# Patient Record
Sex: FEMALE | Race: WHITE | NOT HISPANIC OR LATINO | Employment: FULL TIME | URBAN - NONMETROPOLITAN AREA
[De-identification: names, ages, dates, MRNs, and addresses within clinical notes are randomized per-mention and may not be internally consistent; named-entity substitution may affect disease eponyms.]

---

## 2020-01-23 ENCOUNTER — OFFICE VISIT (OUTPATIENT)
Dept: SURGERY | Facility: CLINIC | Age: 45
End: 2020-01-23

## 2020-01-23 VITALS
TEMPERATURE: 98.6 F | HEIGHT: 69 IN | HEART RATE: 73 BPM | BODY MASS INDEX: 28.58 KG/M2 | SYSTOLIC BLOOD PRESSURE: 138 MMHG | WEIGHT: 193 LBS | DIASTOLIC BLOOD PRESSURE: 88 MMHG

## 2020-01-23 DIAGNOSIS — K81.9 CHOLECYSTITIS: Primary | ICD-10-CM

## 2020-01-23 PROCEDURE — 99204 OFFICE O/P NEW MOD 45 MIN: CPT | Performed by: SURGERY

## 2020-01-23 RX ORDER — ACETAMINOPHEN 325 MG/1
650 TABLET ORAL ONCE
Status: CANCELLED | OUTPATIENT
Start: 2020-01-30 | End: 2020-01-23

## 2020-01-23 RX ORDER — FAMOTIDINE 20 MG/1
20 TABLET, FILM COATED ORAL NIGHTLY PRN
COMMUNITY
End: 2022-12-21

## 2020-01-23 NOTE — PROGRESS NOTES
Subjective   Estee Owens is a 44 y.o. female     Chief Complaint: Biliary colic    History of Present Illness presents with approximately one month of upper abdominal pain made worse by eating.  Pain burning like and clearly sharper and colic like with eating.  Also more severe pain.  No jaundice or hx of pancreatitis.  Had ultrasound done in [place] demonstrates gallstones and normal gallbladder otherwise with normal common bile duct.  Labs are normal.  No hx of reflux or pud per patient.     Review of Systems   Constitutional: Negative.    HENT: Negative.    Eyes: Negative.    Respiratory: Negative.    Cardiovascular: Negative.    Gastrointestinal: Positive for abdominal pain and constipation.        Intermittent epigastric pain,Heartburn   Endocrine: Negative.    Genitourinary: Negative.    Musculoskeletal: Negative.    Skin: Negative.    Allergic/Immunologic: Negative.    Neurological: Negative.    Hematological: Negative.    Psychiatric/Behavioral: Negative.      Past Medical History:   Diagnosis Date   • Asthma    • Gallstones    • PONV (postoperative nausea and vomiting)    • Seasonal allergies      Past Surgical History:   Procedure Laterality Date   • DILATATION AND CURETTAGE       Family History   Problem Relation Age of Onset   • Heart disease Father    • Cancer Maternal Grandmother    • Hypertension Maternal Grandfather    • Heart disease Paternal Grandfather      Social History     Socioeconomic History   • Marital status:      Spouse name: Not on file   • Number of children: Not on file   • Years of education: Not on file   • Highest education level: Not on file   Tobacco Use   • Smoking status: Never Smoker   • Smokeless tobacco: Never Used   Substance and Sexual Activity   • Alcohol use: Never     Frequency: Never   • Drug use: Never   • Sexual activity: Defer     Allergies   Allergen Reactions   • Augmentin [Amoxicillin-Pot Clavulanate] Nausea Only   • Biaxin [Clarithromycin]  Nausea Only     Vitals:    01/23/20 1551   BP: 138/88   Pulse: 73   Temp: 98.6 °F (37 °C)       Home Medications:  Prior to Admission medications    Medication Sig Start Date End Date Taking? Authorizing Provider   famotidine (PEPCID) 20 MG tablet Take 20 mg by mouth 2 (Two) Times a Day.   Yes Provider, MD Samantha       Objective   Physical Exam   Constitutional: She is oriented to person, place, and time. She appears well-developed and well-nourished. No distress.   HENT:   Head: Normocephalic and atraumatic.   Nose: Nose normal.   Eyes: Conjunctivae are normal. No scleral icterus.   Neck: Normal range of motion. No tracheal deviation present. No thyromegaly present.   Cardiovascular: Normal rate, regular rhythm and normal heart sounds.   No murmur heard.  Pulmonary/Chest: Effort normal and breath sounds normal. No respiratory distress. She has no wheezes. She has no rales. She exhibits no tenderness.   Abdominal: Soft. She exhibits no distension. There is no tenderness. There is no rebound and no guarding. No hernia.   Musculoskeletal: She exhibits no tenderness or deformity.   Neurological: She is alert and oriented to person, place, and time.   Skin: Skin is warm and dry. No rash noted.   Psychiatric: She has a normal mood and affect. Her behavior is normal. Judgment and thought content normal.   Vitals reviewed.  mild epigastric and ruq ttp.  No peritoneal signs or mass    Assessment/Plan Cholelithiasis and/or cholecystitis.  Recommend laparoscopic cholecystitis and intraoperative cholangiogram.  Fully discussed procedure, alternatives, risks and benefits with patient and she understands and wishes to procede.        The encounter diagnosis was Cholecystitis.                     This document has been electronically signed by Franky Max MD on January 30, 2020 6:56 AM

## 2020-01-23 NOTE — PATIENT INSTRUCTIONS

## 2020-01-27 ENCOUNTER — APPOINTMENT (OUTPATIENT)
Dept: PREADMISSION TESTING | Facility: HOSPITAL | Age: 45
End: 2020-01-27

## 2020-01-27 VITALS
OXYGEN SATURATION: 99 % | DIASTOLIC BLOOD PRESSURE: 89 MMHG | RESPIRATION RATE: 12 BRPM | HEART RATE: 75 BPM | SYSTOLIC BLOOD PRESSURE: 144 MMHG | BODY MASS INDEX: 28.58 KG/M2 | WEIGHT: 193 LBS | HEIGHT: 69 IN

## 2020-01-27 RX ORDER — SODIUM CHLORIDE, SODIUM GLUCONATE, SODIUM ACETATE, POTASSIUM CHLORIDE, AND MAGNESIUM CHLORIDE 526; 502; 368; 37; 30 MG/100ML; MG/100ML; MG/100ML; MG/100ML; MG/100ML
1000 INJECTION, SOLUTION INTRAVENOUS CONTINUOUS
Status: CANCELLED | OUTPATIENT
Start: 2020-01-30

## 2020-01-29 ENCOUNTER — ANESTHESIA EVENT (OUTPATIENT)
Dept: PERIOP | Facility: HOSPITAL | Age: 45
End: 2020-01-29

## 2020-01-30 ENCOUNTER — HOSPITAL ENCOUNTER (OUTPATIENT)
Facility: HOSPITAL | Age: 45
Setting detail: HOSPITAL OUTPATIENT SURGERY
Discharge: HOME | End: 2020-01-30
Attending: SURGERY | Admitting: SURGERY

## 2020-01-30 ENCOUNTER — APPOINTMENT (OUTPATIENT)
Dept: GENERAL RADIOLOGY | Facility: HOSPITAL | Age: 45
End: 2020-01-30

## 2020-01-30 ENCOUNTER — ANESTHESIA (OUTPATIENT)
Dept: PERIOP | Facility: HOSPITAL | Age: 45
End: 2020-01-30

## 2020-01-30 VITALS
RESPIRATION RATE: 18 BRPM | OXYGEN SATURATION: 96 % | HEART RATE: 82 BPM | DIASTOLIC BLOOD PRESSURE: 68 MMHG | HEIGHT: 69 IN | SYSTOLIC BLOOD PRESSURE: 129 MMHG | TEMPERATURE: 96.9 F | BODY MASS INDEX: 27.62 KG/M2 | WEIGHT: 186.51 LBS

## 2020-01-30 DIAGNOSIS — K81.9 CHOLECYSTITIS: ICD-10-CM

## 2020-01-30 LAB — B-HCG UR QL: NEGATIVE

## 2020-01-30 PROCEDURE — 25010000002 MIDAZOLAM PER 1 MG: Performed by: NURSE ANESTHETIST, CERTIFIED REGISTERED

## 2020-01-30 PROCEDURE — 88304 TISSUE EXAM BY PATHOLOGIST: CPT | Performed by: SURGERY

## 2020-01-30 PROCEDURE — 25010000002 ONDANSETRON PER 1 MG: Performed by: NURSE ANESTHETIST, CERTIFIED REGISTERED

## 2020-01-30 PROCEDURE — 47563 LAPARO CHOLECYSTECTOMY/GRAPH: CPT | Performed by: SURGERY

## 2020-01-30 PROCEDURE — 81025 URINE PREGNANCY TEST: CPT | Performed by: ANESTHESIOLOGY

## 2020-01-30 PROCEDURE — 88304 TISSUE EXAM BY PATHOLOGIST: CPT | Performed by: PATHOLOGY

## 2020-01-30 PROCEDURE — 25010000002 LEVOFLOXACIN PER 250 MG: Performed by: NURSE ANESTHETIST, CERTIFIED REGISTERED

## 2020-01-30 PROCEDURE — 25010000002 IOPAMIDOL 61 % SOLUTION: Performed by: SURGERY

## 2020-01-30 PROCEDURE — 25010000002 HYDROMORPHONE PER 4 MG: Performed by: NURSE ANESTHETIST, CERTIFIED REGISTERED

## 2020-01-30 PROCEDURE — 25010000002 PROPOFOL 10 MG/ML EMULSION: Performed by: NURSE ANESTHETIST, CERTIFIED REGISTERED

## 2020-01-30 PROCEDURE — 25010000002 FENTANYL CITRATE (PF) 100 MCG/2ML SOLUTION: Performed by: NURSE ANESTHETIST, CERTIFIED REGISTERED

## 2020-01-30 PROCEDURE — 25010000002 NEOSTIGMINE 4 MG/4ML SOLUTION PREFILLED SYRINGE: Performed by: NURSE ANESTHETIST, CERTIFIED REGISTERED

## 2020-01-30 PROCEDURE — 25010000002 SUCCINYLCHOLINE PER 20 MG: Performed by: NURSE ANESTHETIST, CERTIFIED REGISTERED

## 2020-01-30 PROCEDURE — 25010000002 DEXAMETHASONE PER 1 MG: Performed by: NURSE ANESTHETIST, CERTIFIED REGISTERED

## 2020-01-30 PROCEDURE — 74300 X-RAY BILE DUCTS/PANCREAS: CPT | Performed by: SURGERY

## 2020-01-30 PROCEDURE — 0: Performed by: SURGERY

## 2020-01-30 PROCEDURE — C1889 IMPLANT/INSERT DEVICE, NOC: HCPCS | Performed by: SURGERY

## 2020-01-30 PROCEDURE — 76000 FLUOROSCOPY <1 HR PHYS/QHP: CPT

## 2020-01-30 RX ORDER — ACETAMINOPHEN 325 MG/1
650 TABLET ORAL ONCE AS NEEDED
Status: DISCONTINUED | OUTPATIENT
Start: 2020-01-30 | End: 2020-01-30 | Stop reason: HOSPADM

## 2020-01-30 RX ORDER — DIPHENHYDRAMINE HYDROCHLORIDE 50 MG/ML
12.5 INJECTION INTRAMUSCULAR; INTRAVENOUS
Status: DISCONTINUED | OUTPATIENT
Start: 2020-01-30 | End: 2020-01-30 | Stop reason: HOSPADM

## 2020-01-30 RX ORDER — ROCURONIUM BROMIDE 10 MG/ML
INJECTION, SOLUTION INTRAVENOUS AS NEEDED
Status: DISCONTINUED | OUTPATIENT
Start: 2020-01-30 | End: 2020-01-30 | Stop reason: SURG

## 2020-01-30 RX ORDER — ACETAMINOPHEN 650 MG/1
650 SUPPOSITORY RECTAL ONCE AS NEEDED
Status: DISCONTINUED | OUTPATIENT
Start: 2020-01-30 | End: 2020-01-30 | Stop reason: HOSPADM

## 2020-01-30 RX ORDER — ACETAMINOPHEN 325 MG/1
650 TABLET ORAL ONCE
Status: COMPLETED | OUTPATIENT
Start: 2020-01-30 | End: 2020-01-30

## 2020-01-30 RX ORDER — NALOXONE HCL 0.4 MG/ML
0.4 VIAL (ML) INJECTION AS NEEDED
Status: DISCONTINUED | OUTPATIENT
Start: 2020-01-30 | End: 2020-01-30 | Stop reason: HOSPADM

## 2020-01-30 RX ORDER — DEXAMETHASONE SODIUM PHOSPHATE 4 MG/ML
INJECTION, SOLUTION INTRA-ARTICULAR; INTRALESIONAL; INTRAMUSCULAR; INTRAVENOUS; SOFT TISSUE AS NEEDED
Status: DISCONTINUED | OUTPATIENT
Start: 2020-01-30 | End: 2020-01-30 | Stop reason: SURG

## 2020-01-30 RX ORDER — FENTANYL CITRATE 50 UG/ML
INJECTION, SOLUTION INTRAMUSCULAR; INTRAVENOUS AS NEEDED
Status: DISCONTINUED | OUTPATIENT
Start: 2020-01-30 | End: 2020-01-30 | Stop reason: SURG

## 2020-01-30 RX ORDER — SUCCINYLCHOLINE CHLORIDE 20 MG/ML
INJECTION INTRAMUSCULAR; INTRAVENOUS AS NEEDED
Status: DISCONTINUED | OUTPATIENT
Start: 2020-01-30 | End: 2020-01-30 | Stop reason: SURG

## 2020-01-30 RX ORDER — BUPIVACAINE HYDROCHLORIDE AND EPINEPHRINE 2.5; 5 MG/ML; UG/ML
INJECTION, SOLUTION EPIDURAL; INFILTRATION; INTRACAUDAL; PERINEURAL AS NEEDED
Status: DISCONTINUED | OUTPATIENT
Start: 2020-01-30 | End: 2020-01-30 | Stop reason: HOSPADM

## 2020-01-30 RX ORDER — PROMETHAZINE HYDROCHLORIDE 25 MG/ML
12.5 INJECTION, SOLUTION INTRAMUSCULAR; INTRAVENOUS ONCE AS NEEDED
Status: DISCONTINUED | OUTPATIENT
Start: 2020-01-30 | End: 2020-01-30 | Stop reason: HOSPADM

## 2020-01-30 RX ORDER — PROMETHAZINE HYDROCHLORIDE 25 MG/1
25 SUPPOSITORY RECTAL ONCE AS NEEDED
Status: DISCONTINUED | OUTPATIENT
Start: 2020-01-30 | End: 2020-01-30 | Stop reason: HOSPADM

## 2020-01-30 RX ORDER — LEVOFLOXACIN 5 MG/ML
INJECTION, SOLUTION INTRAVENOUS AS NEEDED
Status: DISCONTINUED | OUTPATIENT
Start: 2020-01-30 | End: 2020-01-30 | Stop reason: SURG

## 2020-01-30 RX ORDER — ONDANSETRON 4 MG/1
4 TABLET, FILM COATED ORAL DAILY PRN
Qty: 30 TABLET | Refills: 1 | Status: SHIPPED | OUTPATIENT
Start: 2020-01-30 | End: 2021-01-29

## 2020-01-30 RX ORDER — SCOLOPAMINE TRANSDERMAL SYSTEM 1 MG/1
1 PATCH, EXTENDED RELEASE TRANSDERMAL
Status: DISCONTINUED | OUTPATIENT
Start: 2020-01-30 | End: 2020-01-30 | Stop reason: HOSPADM

## 2020-01-30 RX ORDER — ONDANSETRON 2 MG/ML
4 INJECTION INTRAMUSCULAR; INTRAVENOUS ONCE AS NEEDED
Status: COMPLETED | OUTPATIENT
Start: 2020-01-30 | End: 2020-01-30

## 2020-01-30 RX ORDER — HYDROMORPHONE HCL 110MG/55ML
PATIENT CONTROLLED ANALGESIA SYRINGE INTRAVENOUS AS NEEDED
Status: DISCONTINUED | OUTPATIENT
Start: 2020-01-30 | End: 2020-01-30 | Stop reason: SURG

## 2020-01-30 RX ORDER — ONDANSETRON 2 MG/ML
INJECTION INTRAMUSCULAR; INTRAVENOUS AS NEEDED
Status: DISCONTINUED | OUTPATIENT
Start: 2020-01-30 | End: 2020-01-30 | Stop reason: SURG

## 2020-01-30 RX ORDER — NEOSTIGMINE METHYLSULFATE 4 MG/4 ML
SYRINGE (ML) INTRAVENOUS AS NEEDED
Status: DISCONTINUED | OUTPATIENT
Start: 2020-01-30 | End: 2020-01-30 | Stop reason: SURG

## 2020-01-30 RX ORDER — LIDOCAINE HYDROCHLORIDE 20 MG/ML
INJECTION, SOLUTION INFILTRATION; PERINEURAL AS NEEDED
Status: DISCONTINUED | OUTPATIENT
Start: 2020-01-30 | End: 2020-01-30 | Stop reason: SURG

## 2020-01-30 RX ORDER — 0.9 % SODIUM CHLORIDE 0.9 %
VIAL (ML) INJECTION AS NEEDED
Status: DISCONTINUED | OUTPATIENT
Start: 2020-01-30 | End: 2020-01-30 | Stop reason: HOSPADM

## 2020-01-30 RX ORDER — EPHEDRINE SULFATE 50 MG/ML
5 INJECTION, SOLUTION INTRAVENOUS ONCE AS NEEDED
Status: DISCONTINUED | OUTPATIENT
Start: 2020-01-30 | End: 2020-01-30 | Stop reason: HOSPADM

## 2020-01-30 RX ORDER — SODIUM CHLORIDE, SODIUM GLUCONATE, SODIUM ACETATE, POTASSIUM CHLORIDE, AND MAGNESIUM CHLORIDE 526; 502; 368; 37; 30 MG/100ML; MG/100ML; MG/100ML; MG/100ML; MG/100ML
1000 INJECTION, SOLUTION INTRAVENOUS CONTINUOUS
Status: DISCONTINUED | OUTPATIENT
Start: 2020-01-30 | End: 2020-01-30 | Stop reason: HOSPADM

## 2020-01-30 RX ORDER — MIDAZOLAM HYDROCHLORIDE 1 MG/ML
INJECTION INTRAMUSCULAR; INTRAVENOUS AS NEEDED
Status: DISCONTINUED | OUTPATIENT
Start: 2020-01-30 | End: 2020-01-30 | Stop reason: SURG

## 2020-01-30 RX ORDER — HYDROCODONE BITARTRATE AND ACETAMINOPHEN 5; 325 MG/1; MG/1
1 TABLET ORAL EVERY 6 HOURS PRN
Qty: 15 TABLET | Refills: 0 | Status: SHIPPED | OUTPATIENT
Start: 2020-01-30 | End: 2022-07-11

## 2020-01-30 RX ORDER — PROPOFOL 10 MG/ML
VIAL (ML) INTRAVENOUS AS NEEDED
Status: DISCONTINUED | OUTPATIENT
Start: 2020-01-30 | End: 2020-01-30 | Stop reason: SURG

## 2020-01-30 RX ORDER — LABETALOL HYDROCHLORIDE 5 MG/ML
5 INJECTION, SOLUTION INTRAVENOUS
Status: DISCONTINUED | OUTPATIENT
Start: 2020-01-30 | End: 2020-01-30 | Stop reason: HOSPADM

## 2020-01-30 RX ORDER — PROMETHAZINE HYDROCHLORIDE 25 MG/1
25 TABLET ORAL ONCE AS NEEDED
Status: DISCONTINUED | OUTPATIENT
Start: 2020-01-30 | End: 2020-01-30 | Stop reason: HOSPADM

## 2020-01-30 RX ORDER — FLUMAZENIL 0.1 MG/ML
0.2 INJECTION INTRAVENOUS AS NEEDED
Status: DISCONTINUED | OUTPATIENT
Start: 2020-01-30 | End: 2020-01-30 | Stop reason: HOSPADM

## 2020-01-30 RX ADMIN — ONDANSETRON 4 MG: 2 INJECTION INTRAMUSCULAR; INTRAVENOUS at 08:02

## 2020-01-30 RX ADMIN — ROCURONIUM BROMIDE 5 MG: 10 INJECTION INTRAVENOUS at 07:15

## 2020-01-30 RX ADMIN — ACETAMINOPHEN 650 MG: 325 TABLET, FILM COATED ORAL at 06:11

## 2020-01-30 RX ADMIN — GLYCOPYRROLATE 0.6 MG: 0.2 INJECTION, SOLUTION INTRAMUSCULAR; INTRAVITREAL at 08:03

## 2020-01-30 RX ADMIN — SUCCINYLCHOLINE CHLORIDE 140 MG: 20 INJECTION, SOLUTION INTRAMUSCULAR; INTRAVENOUS at 07:15

## 2020-01-30 RX ADMIN — ROCURONIUM BROMIDE 25 MG: 10 INJECTION INTRAVENOUS at 07:26

## 2020-01-30 RX ADMIN — LEVOFLOXACIN 500 MG: 5 INJECTION, SOLUTION INTRAVENOUS at 07:31

## 2020-01-30 RX ADMIN — PROPOFOL 150 MG: 10 INJECTION, EMULSION INTRAVENOUS at 07:15

## 2020-01-30 RX ADMIN — LIDOCAINE HYDROCHLORIDE 80 MG: 20 INJECTION, SOLUTION INFILTRATION; PERINEURAL at 07:15

## 2020-01-30 RX ADMIN — ONDANSETRON 4 MG: 2 INJECTION INTRAMUSCULAR; INTRAVENOUS at 09:46

## 2020-01-30 RX ADMIN — HYDROMORPHONE HYDROCHLORIDE 0.5 MG: 2 INJECTION INTRAMUSCULAR; INTRAVENOUS; SUBCUTANEOUS at 08:10

## 2020-01-30 RX ADMIN — SODIUM CHLORIDE, SODIUM GLUCONATE, SODIUM ACETATE, POTASSIUM CHLORIDE, AND MAGNESIUM CHLORIDE 1000 ML: 526; 502; 368; 37; 30 INJECTION, SOLUTION INTRAVENOUS at 06:15

## 2020-01-30 RX ADMIN — FENTANYL CITRATE 100 MCG: 50 INJECTION, SOLUTION INTRAMUSCULAR; INTRAVENOUS at 07:15

## 2020-01-30 RX ADMIN — SODIUM CHLORIDE, SODIUM GLUCONATE, SODIUM ACETATE, POTASSIUM CHLORIDE, AND MAGNESIUM CHLORIDE: 526; 502; 368; 37; 30 INJECTION, SOLUTION INTRAVENOUS at 07:07

## 2020-01-30 RX ADMIN — MIDAZOLAM HYDROCHLORIDE 2 MG: 2 INJECTION, SOLUTION INTRAMUSCULAR; INTRAVENOUS at 07:06

## 2020-01-30 RX ADMIN — SCOPALAMINE 1 PATCH: 1 PATCH, EXTENDED RELEASE TRANSDERMAL at 06:29

## 2020-01-30 RX ADMIN — Medication 3 MG: at 08:03

## 2020-01-30 RX ADMIN — SODIUM CHLORIDE, SODIUM GLUCONATE, SODIUM ACETATE, POTASSIUM CHLORIDE, AND MAGNESIUM CHLORIDE: 526; 502; 368; 37; 30 INJECTION, SOLUTION INTRAVENOUS at 08:00

## 2020-01-30 RX ADMIN — DEXAMETHASONE SODIUM PHOSPHATE 4 MG: 4 INJECTION, SOLUTION INTRAMUSCULAR; INTRAVENOUS at 08:02

## 2020-01-30 NOTE — ANESTHESIA POSTPROCEDURE EVALUATION
Patient: Estee Owens    Procedure Summary     Date:  01/30/20 Room / Location:  [facility] OR 09 / [facility] OR    Anesthesia Start:  0709 Anesthesia Stop:  0820    Procedure:  CHOLECYSTECTOMY LAPAROSCOPIC INTRAOPERATIVE CHOLANGIOGRAM      (C-ARM#2)        (ANESTHESIA REQUEST JEANNINE OR TIFFANIE, NO ANESTHESIA STUDENTS) (N/A Abdomen) Diagnosis:       Cholecystitis      (Cholecystitis [K81.9])    Surgeon:  Franky Max MD Provider:  Marcos Latham MD    Anesthesia Type:  general ASA Status:  2          Anesthesia Type: general    Vitals  No vitals data found for the desired time range.          Post Anesthesia Care and Evaluation    Patient location during evaluation: PACU  Patient participation: complete - patient participated  Level of consciousness: awake and alert  Pain score: 0  Pain management: adequate  Airway patency: patent  Anesthetic complications: No anesthetic complications  PONV Status: none  Cardiovascular status: acceptable  Respiratory status: acceptable  Hydration status: acceptable

## 2020-01-30 NOTE — ANESTHESIA PROCEDURE NOTES
Airway  Urgency: elective    Date/Time: 1/30/2020 7:16 AM  Airway not difficult    General Information and Staff    Patient location during procedure: OR  CRNA: Haleigh Dang CRNA    Indications and Patient Condition  Indications for airway management: airway protection    Preoxygenated: yes  Mask difficulty assessment: 1 - vent by mask    Final Airway Details  Final airway type: endotracheal airway      Successful airway: ETT  Cuffed: yes   Successful intubation technique: direct laryngoscopy  Facilitating devices/methods: intubating stylet  Endotracheal tube insertion site: oral  Blade: Skinny  Blade size: 4  ETT size (mm): 7.0  Cormack-Lehane Classification: grade I - full view of glottis  Placement verified by: chest auscultation and capnometry   Measured from: lips  ETT/EBT  to lips (cm): 23  Number of attempts at approach: 1  Assessment: lips, teeth, and gum same as pre-op and atraumatic intubation

## 2020-01-31 LAB
LAB AP CASE REPORT: NORMAL
PATH REPORT.FINAL DX SPEC: NORMAL
PATH REPORT.GROSS SPEC: NORMAL

## 2020-02-05 ENCOUNTER — OFFICE VISIT (OUTPATIENT)
Dept: SURGERY | Facility: CLINIC | Age: 45
End: 2020-02-05

## 2020-02-05 VITALS
HEART RATE: 91 BPM | TEMPERATURE: 97.8 F | BODY MASS INDEX: 27.55 KG/M2 | WEIGHT: 186 LBS | SYSTOLIC BLOOD PRESSURE: 120 MMHG | HEIGHT: 69 IN | DIASTOLIC BLOOD PRESSURE: 84 MMHG

## 2020-02-05 DIAGNOSIS — K81.9 CHOLECYSTITIS: Primary | ICD-10-CM

## 2020-02-05 DIAGNOSIS — K21.9 GASTROESOPHAGEAL REFLUX DISEASE WITHOUT ESOPHAGITIS: ICD-10-CM

## 2020-02-05 PROCEDURE — 99024 POSTOP FOLLOW-UP VISIT: CPT | Performed by: SURGERY

## 2020-02-05 RX ORDER — ESOMEPRAZOLE MAGNESIUM 40 MG/1
40 CAPSULE, DELAYED RELEASE ORAL DAILY
Qty: 30 CAPSULE | Refills: 1 | Status: SHIPPED | OUTPATIENT
Start: 2020-02-05 | End: 2021-02-04

## 2020-02-05 NOTE — PATIENT INSTRUCTIONS

## 2020-02-05 NOTE — PROGRESS NOTES
44-year-old female who is now 1 week out from laparoscopic cholecystectomy and normal intraoperative cholangiogram for chronic cholecystitis and cholelithiasis.  Patient is done well with the surgery overall.  She however states that she has the same burning sensation that she describes in her epigastrium if she had before the surgery.  It seems to get worse when she eats and she points to her upper epigastrium.  She has been taking Pepcid and it does not seem to be helping.  She takes the Pepcid in the morning.  She can have the symptoms during the day or at evening.  Before surgery she did have one episode awaken her from sleep all of this to did suggest that it was her gallbladder.  She tells me that these are pretty much the same symptoms that she had before the surgery however.  Otherwise she is doing well    She is nonicteric abdomen is soft  Incisions are all clean appear to be healing nicely      Assessment and plan  Suspect symptoms were not related to her gallbladder.  Fully discussed this with both the patient and her .  We will give the patient a trial of a PPI and hold the Pepcid.  Advised her to take the PPI twice a day and follow-up with me in a week or sooner if she has any other concerns or questions.  Patient may need to have EGD and further work-up and she understands

## 2020-02-06 ENCOUNTER — TELEPHONE (OUTPATIENT)
Dept: SURGERY | Facility: CLINIC | Age: 45
End: 2020-02-06

## 2020-02-06 NOTE — TELEPHONE ENCOUNTER
NHAN CALLED ABOUT THE PRESCRIPTION FOR NEXIUM.  THE PHARMACY TOLD HER TO TAKE 1 PER DAY BUT SHE THINKS YOU TOLD HER MORNING AND EVENING.    PLEASE ADVISE  PHONE [phone]

## 2020-02-11 ENCOUNTER — OFFICE VISIT (OUTPATIENT)
Dept: SURGERY | Facility: CLINIC | Age: 45
End: 2020-02-11

## 2020-02-11 VITALS
BODY MASS INDEX: 27.67 KG/M2 | DIASTOLIC BLOOD PRESSURE: 74 MMHG | HEART RATE: 77 BPM | SYSTOLIC BLOOD PRESSURE: 122 MMHG | TEMPERATURE: 97 F | WEIGHT: 186.8 LBS | HEIGHT: 69 IN

## 2020-02-11 DIAGNOSIS — R10.10 PAIN OF UPPER ABDOMEN: ICD-10-CM

## 2020-02-11 DIAGNOSIS — K81.9 CHOLECYSTITIS: Primary | ICD-10-CM

## 2020-02-11 PROCEDURE — 99024 POSTOP FOLLOW-UP VISIT: CPT | Performed by: SURGERY

## 2020-02-11 NOTE — PROGRESS NOTES
44-year-old female who is now over 2 weeks out from laparoscopic cholecystectomy and normal her operative cholangiogram.  She did have cholecystitis and had cholelithiasis.  She continues to have this epigastric discomfort.  Is the same as she had before she had her gallbladder surgery.  Suspect she may have been asymptomatic from her gallstones.  We have tempted her with a trial of PPI but she has had no real improvement per her report.  She gets this pain and associated nausea when she eats it is clearly made worse.    On exam she is nonicteric her abdomen is soft incisions are all clean and healing nicely    Long discussion with the patient.  I think she needs to have further work-up and there may be something else causing the symptom obviously was not her gallbladder.  She clearly states that the same symptoms were going on before the surgery as she has now.  We will go ahead and refer her to the GI service with him scope her and further work-up as they feel appropriate.  Patient will follow-up with us if we can be of further assistance.

## 2020-02-11 NOTE — PATIENT INSTRUCTIONS

## 2020-02-20 ENCOUNTER — HOSPITAL ENCOUNTER (OUTPATIENT)
Facility: HOSPITAL | Age: 45
Setting detail: HOSPITAL OUTPATIENT SURGERY
End: 2020-02-20
Attending: INTERNAL MEDICINE | Admitting: INTERNAL MEDICINE

## 2020-06-21 PROCEDURE — U0003 INFECTIOUS AGENT DETECTION BY NUCLEIC ACID (DNA OR RNA); SEVERE ACUTE RESPIRATORY SYNDROME CORONAVIRUS 2 (SARS-COV-2) (CORONAVIRUS DISEASE [COVID-19]), AMPLIFIED PROBE TECHNIQUE, MAKING USE OF HIGH THROUGHPUT TECHNOLOGIES AS DESCRIBED BY CMS-2020-01-R: HCPCS | Performed by: INTERNAL MEDICINE

## 2020-06-22 LAB
COVID LABCORP PRIORITY: NORMAL
SARS-COV-2 RNA RESP QL NAA+PROBE: NOT DETECTED

## 2020-06-24 ENCOUNTER — ANESTHESIA EVENT (OUTPATIENT)
Dept: GASTROENTEROLOGY | Facility: HOSPITAL | Age: 45
End: 2020-06-24

## 2020-06-24 ENCOUNTER — ANESTHESIA (OUTPATIENT)
Dept: GASTROENTEROLOGY | Facility: HOSPITAL | Age: 45
End: 2020-06-24

## 2020-06-24 ENCOUNTER — HOSPITAL ENCOUNTER (OUTPATIENT)
Facility: HOSPITAL | Age: 45
Setting detail: HOSPITAL OUTPATIENT SURGERY
Discharge: HOME | End: 2020-06-24
Attending: INTERNAL MEDICINE | Admitting: INTERNAL MEDICINE

## 2020-06-24 VITALS
OXYGEN SATURATION: 99 % | DIASTOLIC BLOOD PRESSURE: 80 MMHG | BODY MASS INDEX: 27.34 KG/M2 | TEMPERATURE: 97.6 F | WEIGHT: 185.13 LBS | SYSTOLIC BLOOD PRESSURE: 152 MMHG | HEART RATE: 91 BPM | RESPIRATION RATE: 18 BRPM

## 2020-06-24 DIAGNOSIS — K30 MILD DIETARY INDIGESTION: ICD-10-CM

## 2020-06-24 PROCEDURE — 25010000002 PROPOFOL 10 MG/ML EMULSION: Performed by: NURSE ANESTHETIST, CERTIFIED REGISTERED

## 2020-06-24 PROCEDURE — 87071 CULTURE AEROBIC QUANT OTHER: CPT | Performed by: INTERNAL MEDICINE

## 2020-06-24 PROCEDURE — 87147 CULTURE TYPE IMMUNOLOGIC: CPT | Performed by: INTERNAL MEDICINE

## 2020-06-24 RX ORDER — VENLAFAXINE 37.5 MG/1
37.5 TABLET ORAL DAILY
COMMUNITY
End: 2022-07-11

## 2020-06-24 RX ORDER — DROSPIRENONE AND ETHINYL ESTRADIOL 0.02-3(28)
1 KIT ORAL DAILY
COMMUNITY
End: 2022-12-21

## 2020-06-24 RX ORDER — MEPERIDINE HYDROCHLORIDE 25 MG/ML
12.5 INJECTION INTRAMUSCULAR; INTRAVENOUS; SUBCUTANEOUS
Status: DISCONTINUED | OUTPATIENT
Start: 2020-06-24 | End: 2020-06-24 | Stop reason: HOSPADM

## 2020-06-24 RX ORDER — PROMETHAZINE HYDROCHLORIDE 25 MG/ML
12.5 INJECTION, SOLUTION INTRAMUSCULAR; INTRAVENOUS ONCE AS NEEDED
Status: DISCONTINUED | OUTPATIENT
Start: 2020-06-24 | End: 2020-06-24 | Stop reason: HOSPADM

## 2020-06-24 RX ORDER — ONDANSETRON 2 MG/ML
4 INJECTION INTRAMUSCULAR; INTRAVENOUS ONCE AS NEEDED
Status: DISCONTINUED | OUTPATIENT
Start: 2020-06-24 | End: 2020-06-24 | Stop reason: HOSPADM

## 2020-06-24 RX ORDER — PROPOFOL 10 MG/ML
VIAL (ML) INTRAVENOUS AS NEEDED
Status: DISCONTINUED | OUTPATIENT
Start: 2020-06-24 | End: 2020-06-24 | Stop reason: SURG

## 2020-06-24 RX ORDER — OMEPRAZOLE 20 MG/1
20 CAPSULE, DELAYED RELEASE ORAL DAILY
COMMUNITY
End: 2022-07-11

## 2020-06-24 RX ORDER — PROMETHAZINE HYDROCHLORIDE 25 MG/1
25 TABLET ORAL ONCE AS NEEDED
Status: DISCONTINUED | OUTPATIENT
Start: 2020-06-24 | End: 2020-06-24 | Stop reason: HOSPADM

## 2020-06-24 RX ORDER — PROMETHAZINE HYDROCHLORIDE 25 MG/1
25 SUPPOSITORY RECTAL ONCE AS NEEDED
Status: DISCONTINUED | OUTPATIENT
Start: 2020-06-24 | End: 2020-06-24 | Stop reason: HOSPADM

## 2020-06-24 RX ORDER — LIDOCAINE HYDROCHLORIDE 20 MG/ML
INJECTION, SOLUTION INTRAVENOUS AS NEEDED
Status: DISCONTINUED | OUTPATIENT
Start: 2020-06-24 | End: 2020-06-24 | Stop reason: SURG

## 2020-06-24 RX ORDER — DEXTROSE AND SODIUM CHLORIDE 5; .45 G/100ML; G/100ML
30 INJECTION, SOLUTION INTRAVENOUS CONTINUOUS PRN
Status: DISCONTINUED | OUTPATIENT
Start: 2020-06-24 | End: 2020-06-24 | Stop reason: HOSPADM

## 2020-06-24 RX ADMIN — DEXTROSE AND SODIUM CHLORIDE 30 ML/HR: 5; 450 INJECTION, SOLUTION INTRAVENOUS at 16:01

## 2020-06-24 RX ADMIN — PROPOFOL 50 MG: 10 INJECTION, EMULSION INTRAVENOUS at 16:37

## 2020-06-24 RX ADMIN — PROPOFOL 50 MG: 10 INJECTION, EMULSION INTRAVENOUS at 16:29

## 2020-06-24 RX ADMIN — LIDOCAINE HYDROCHLORIDE 50 MG: 20 INJECTION, SOLUTION INTRAVENOUS at 16:25

## 2020-06-24 RX ADMIN — PROPOFOL 50 MG: 10 INJECTION, EMULSION INTRAVENOUS at 16:35

## 2020-06-24 RX ADMIN — PROPOFOL 50 MG: 10 INJECTION, EMULSION INTRAVENOUS at 16:32

## 2020-06-24 RX ADMIN — PROPOFOL 50 MG: 10 INJECTION, EMULSION INTRAVENOUS at 16:25

## 2020-06-24 RX ADMIN — PROPOFOL 50 MG: 10 INJECTION, EMULSION INTRAVENOUS at 16:27

## 2020-06-24 NOTE — ANESTHESIA PREPROCEDURE EVALUATION
Anesthesia Evaluation     history of anesthetic complications: PONV  NPO Solid Status: > 8 hours             Airway   Mallampati: III  TM distance: >3 FB  Neck ROM: full  No difficulty expected  Dental - normal exam     Pulmonary - normal exam   (+) asthma,  Cardiovascular - normal exam        Neuro/Psych  GI/Hepatic/Renal/Endo    (+)  GERD,      Musculoskeletal     Abdominal    Substance History      OB/GYN          Other                        Anesthesia Plan    ASA 2     MAC     intravenous induction     Anesthetic plan, all risks, benefits, and alternatives have been provided, discussed and informed consent has been obtained with: patient.

## 2020-06-24 NOTE — ANESTHESIA POSTPROCEDURE EVALUATION
Patient: Estee Owens    Procedure Summary     Date:  06/24/20 Room / Location:  [facility] ENDOSCOPY 2 / [facility] ENDOSCOPY    Anesthesia Start:  1623 Anesthesia Stop:  1642    Procedure:  ESOPHAGOGASTRODUODENOSCOPY (N/A ) Diagnosis:       Esophageal stricture      Gastritis      (Mild dietary indigestion [K30])    Surgeon:  Saad Lizarraga DO Provider:  Primo Neil CRNA    Anesthesia Type:  MAC ASA Status:  2          Anesthesia Type: MAC    Vitals  No vitals data found for the desired time range.          Post Anesthesia Care and Evaluation    Patient location during evaluation: bedside  Patient participation: complete - patient cannot participate  Level of consciousness: awake  Pain score: 0  Pain management: adequate  Airway patency: patent  Anesthetic complications: No anesthetic complications  PONV Status: none  Cardiovascular status: acceptable  Respiratory status: acceptable  Hydration status: acceptable

## 2020-06-26 LAB
BACTERIA SPEC AEROBE CULT: ABNORMAL

## 2020-06-29 LAB
LAB AP CASE REPORT: NORMAL
PATH REPORT.FINAL DX SPEC: NORMAL

## 2022-06-30 ENCOUNTER — PREP FOR SURGERY (OUTPATIENT)
Dept: OTHER | Facility: HOSPITAL | Age: 47
End: 2022-06-30

## 2022-06-30 DIAGNOSIS — Z12.11 ENCOUNTER FOR SCREENING FOR MALIGNANT NEOPLASM OF COLON: Primary | ICD-10-CM

## 2022-06-30 DIAGNOSIS — A04.9 BACTERIAL ENTERITIS: ICD-10-CM

## 2022-06-30 RX ORDER — DEXTROSE AND SODIUM CHLORIDE 5; .45 G/100ML; G/100ML
30 INJECTION, SOLUTION INTRAVENOUS CONTINUOUS PRN
Status: CANCELLED | OUTPATIENT
Start: 2022-07-14

## 2022-07-05 PROBLEM — Z12.11 ENCOUNTER FOR SCREENING FOR MALIGNANT NEOPLASM OF COLON: Status: ACTIVE | Noted: 2022-07-05

## 2022-07-05 PROBLEM — A04.9 BACTERIAL ENTERITIS: Status: ACTIVE | Noted: 2022-07-05

## 2022-07-11 RX ORDER — SPIRONOLACTONE 50 MG/1
50 TABLET, FILM COATED ORAL DAILY
COMMUNITY

## 2022-07-14 ENCOUNTER — HOSPITAL ENCOUNTER (OUTPATIENT)
Facility: HOSPITAL | Age: 47
Setting detail: HOSPITAL OUTPATIENT SURGERY
Discharge: HOME | End: 2022-07-14
Attending: INTERNAL MEDICINE | Admitting: INTERNAL MEDICINE

## 2022-07-14 ENCOUNTER — ANESTHESIA (OUTPATIENT)
Dept: GASTROENTEROLOGY | Facility: HOSPITAL | Age: 47
End: 2022-07-14

## 2022-07-14 ENCOUNTER — ANESTHESIA EVENT (OUTPATIENT)
Dept: GASTROENTEROLOGY | Facility: HOSPITAL | Age: 47
End: 2022-07-14

## 2022-07-14 VITALS
DIASTOLIC BLOOD PRESSURE: 70 MMHG | TEMPERATURE: 97.2 F | HEIGHT: 68 IN | RESPIRATION RATE: 16 BRPM | BODY MASS INDEX: 26.93 KG/M2 | SYSTOLIC BLOOD PRESSURE: 136 MMHG | HEART RATE: 84 BPM | OXYGEN SATURATION: 99 % | WEIGHT: 177.69 LBS

## 2022-07-14 DIAGNOSIS — Z12.11 ENCOUNTER FOR SCREENING FOR MALIGNANT NEOPLASM OF COLON: ICD-10-CM

## 2022-07-14 DIAGNOSIS — A04.9 BACTERIAL ENTERITIS: ICD-10-CM

## 2022-07-14 PROCEDURE — 25010000002 PROPOFOL 10 MG/ML EMULSION: Performed by: NURSE ANESTHETIST, CERTIFIED REGISTERED

## 2022-07-14 PROCEDURE — 87071 CULTURE AEROBIC QUANT OTHER: CPT | Performed by: INTERNAL MEDICINE

## 2022-07-14 RX ORDER — DEXTROSE AND SODIUM CHLORIDE 5; .45 G/100ML; G/100ML
30 INJECTION, SOLUTION INTRAVENOUS CONTINUOUS PRN
Status: DISCONTINUED | OUTPATIENT
Start: 2022-07-14 | End: 2022-07-14 | Stop reason: HOSPADM

## 2022-07-14 RX ORDER — LIDOCAINE HYDROCHLORIDE 20 MG/ML
INJECTION, SOLUTION INTRAVENOUS AS NEEDED
Status: DISCONTINUED | OUTPATIENT
Start: 2022-07-14 | End: 2022-07-14 | Stop reason: SURG

## 2022-07-14 RX ORDER — PROPOFOL 10 MG/ML
VIAL (ML) INTRAVENOUS AS NEEDED
Status: DISCONTINUED | OUTPATIENT
Start: 2022-07-14 | End: 2022-07-14 | Stop reason: SURG

## 2022-07-14 RX ADMIN — DEXTROSE AND SODIUM CHLORIDE 30 ML/HR: 5; 450 INJECTION, SOLUTION INTRAVENOUS at 15:46

## 2022-07-14 RX ADMIN — PROPOFOL 50 MG: 10 INJECTION, EMULSION INTRAVENOUS at 16:27

## 2022-07-14 RX ADMIN — PROPOFOL 50 MG: 10 INJECTION, EMULSION INTRAVENOUS at 16:29

## 2022-07-14 RX ADMIN — PROPOFOL 50 MG: 10 INJECTION, EMULSION INTRAVENOUS at 16:30

## 2022-07-14 RX ADMIN — PROPOFOL 40 MG: 10 INJECTION, EMULSION INTRAVENOUS at 16:33

## 2022-07-14 RX ADMIN — PROPOFOL 30 MG: 10 INJECTION, EMULSION INTRAVENOUS at 16:23

## 2022-07-14 RX ADMIN — PROPOFOL 130 MG: 10 INJECTION, EMULSION INTRAVENOUS at 16:18

## 2022-07-14 RX ADMIN — PROPOFOL 30 MG: 10 INJECTION, EMULSION INTRAVENOUS at 16:25

## 2022-07-14 RX ADMIN — PROPOFOL 40 MG: 10 INJECTION, EMULSION INTRAVENOUS at 16:31

## 2022-07-14 RX ADMIN — PROPOFOL 40 MG: 10 INJECTION, EMULSION INTRAVENOUS at 16:21

## 2022-07-14 RX ADMIN — PROPOFOL 40 MG: 10 INJECTION, EMULSION INTRAVENOUS at 16:34

## 2022-07-14 RX ADMIN — LIDOCAINE HYDROCHLORIDE 80 MG: 20 INJECTION, SOLUTION INTRAVENOUS at 16:18

## 2022-07-14 NOTE — ANESTHESIA POSTPROCEDURE EVALUATION
Patient: Estee Owens    Procedure Summary     Date: 07/14/22 Room / Location: [facility] ENDOSCOPY 2 / [facility] ENDOSCOPY    Anesthesia Start: 1617 Anesthesia Stop: 1639    Procedures:       ESOPHAGOGASTRODUODENOSCOPY 4:00 (N/A )      COLONOSCOPY 4:00 (N/A ) Diagnosis:       Encounter for screening for malignant neoplasm of colon      Bacterial enteritis      (Encounter for screening for malignant neoplasm of colon [Z12.11])      (Bacterial enteritis [A04.9])    Surgeons: Saad Lizarraga DO Provider: Radha Garcia CRNA    Anesthesia Type: MAC ASA Status: 3          Anesthesia Type: MAC    Vitals  No vitals data found for the desired time range.          Post Anesthesia Care and Evaluation    Patient location during evaluation: bedside  Patient participation: waiting for patient participation  Level of consciousness: sleepy but conscious  Pain score: 0  Pain management: adequate    Airway patency: patent  Anesthetic complications: No anesthetic complications  PONV Status: none  Cardiovascular status: acceptable  Respiratory status: acceptable  Hydration status: acceptable

## 2022-07-14 NOTE — H&P
Saad Bowden DO,[facility]  Gastroenterology  Hepatology  Endoscopy  Board Certified in Internal Medicine and gastroenterology  [address], [address]  [place]. [address]  - [phone]   F - [phone]     GASTROENTEROLOGY HISTORY AND PHYSICAL  NOTE   SAAD BOWDEN DO.         SUBJECTIVE:   7/14/2022    Name: Estee Owens  DOD: 1975      Chief Complaint:     Subjective : Dyspepsia.  Screening for colon cancer  Patient is 46 y.o. female presents with desire for elective EGD with biopsy and culture and and colonoscopy.      ROS/HISTORY/ CURRENT MEDICATIONS/OBJECTIVE/VS/PE:   Review of Systems:  All systems unremarkable unless specified below.  Constitutional   HENT  Eyes   Respiratory    Cardiovascular  Gastrointestinal   Endocrine  Genitourinary    Musculoskeletal   Skin  Allergic/Immunologic    Neurological    Hematological  Psychiatric/Behavioral    History:     Past Medical History:   Diagnosis Date    Asthma     Gallstones     PONV (postoperative nausea and vomiting)     Seasonal allergies      Past Surgical History:   Procedure Laterality Date    CHOLECYSTECTOMY WITH INTRAOPERATIVE CHOLANGIOGRAM N/A 1/30/2020    Procedure: CHOLECYSTECTOMY LAPAROSCOPIC INTRAOPERATIVE CHOLANGIOGRAM      (C-ARM#2)        (ANESTHESIA REQUEST JEANNINE OR TIFFANIE, NO ANESTHESIA STUDENTS);  Surgeon: Franky Max MD;  Location: [facility] OR;  Service: General    DILATATION AND CURETTAGE      ENDOSCOPY N/A 6/24/2020    Procedure: ESOPHAGOGASTRODUODENOSCOPY;  Surgeon: Saad Bowden DO;  Location: [facility] ENDOSCOPY;  Service: Gastroenterology;  Laterality: N/A;     Family History   Problem Relation Age of Onset    Heart disease Father     Cancer Maternal Grandmother     Hypertension Maternal Grandfather     Heart disease Paternal Grandfather      Social History     Tobacco Use    Smoking status: Never Smoker    Smokeless tobacco: Never Used   Vaping Use    Vaping Use: Never used   Substance  "Use Topics    Alcohol use: Never    Drug use: Never     Prior to Admission medications    Medication Sig Start Date End Date Taking? Authorizing Provider   drospirenone-ethinyl estradiol (SHELBI,GIANVI) 3-0.02 MG per tablet Take 1 tablet by mouth Daily.    ProviderSamantha MD   famotidine (PEPCID) 20 MG tablet Take 20 mg by mouth At Night As Needed.    ProviderSamantha MD   spironolactone (ALDACTONE) 50 MG tablet Take 50 mg by mouth Daily.    ProviderSamantha MD     Allergies:  Augmentin [amoxicillin-pot clavulanate] and Biaxin [clarithromycin]    I have reviewed the patients medical history, surgical history and family history in the available medical record system.     Current Medications:     No current facility-administered medications for this encounter.       Objective     Physical Exam:      /70   Pulse 84   Temp 97.2 °F (36.2 °C)   Resp 16   Ht 172.7 cm (68\")   Wt 80.6 kg (177 lb 11.1 oz)   LMP 06/15/2022 (Approximate)   SpO2 99%   BMI 27.02 kg/m²    Physical Exam:  General Appearance:    Alert, cooperative, in no acute distress   Head:    Normocephalic, without obvious abnormality, atraumatic   Eyes:            Lids and lashes normal, conjunctivae and sclerae normal, no icterus, no pallor, corneas clear, PERRLA   Ears:    Ears appear intact with no abnormalities noted   Throat:   No oral lesions, no thrush, oral mucosa moist   Neck:   No adenopathy, supple, trachea midline, no thyromegaly, no  carotid bruit, no JVD   Back:     No kyphosis present, no scoliosis present, no skin lesions,   erythema or scars, no tenderness to percussion or                 palpation,  range of motion normal   Lungs:     Clear to auscultation,respirations regular, even and         unlabored    Heart:    Regular rhythm and normal rate, normal S1 and S2, no  murmur, no gallop, no rub, no click   Breast Exam:    Deferred   Abdomen:     Normal bowel sounds, no masses, no organomegaly, soft  nontender, " nondistended, no guarding, no rebound                 tenderness   Genitalia:    Deferred   Extremities:   Moves all extremities well, no edema, no cyanosis, no          redness   Pulses:   Pulses palpable and equal bilaterally   Skin:   No bleeding, bruising or rash   Lymph nodes:   No palpable adenopathy   Neurologic:   Cranial nerves 2 - 12 grossly intact, sensation intact, DTR     present and equal bilaterally      Results Review:     Lab Results   Component Value Date    WBC 11.1 (H) 01/01/2019    HGB 13.6 01/01/2019    HCT 40.4 01/01/2019     01/01/2019             No results found for: LIPASE  No results found for: INR  No results found for: CULTURE    Radiology Review:  Imaging Results (Last 72 Hours)       ** No results found for the last 72 hours. **             I reviewed the patient's new clinical results.  I reviewed the patient's new imaging results and agree with the interpretation.     ASSESSMENT/PLAN:   ASSESSMENT:  1.  Dyspepsia  2.  Screening for colon cancer    PLAN:  1.  Esophagogastroduodenoscopy with biopsy and culture  2.  Colonoscopy    Risk and benefits associated with the procedure are reviewed with the patient.  The patient wished to proceed     Saad Lizarraga DO  07/14/22  15:27 CDT

## 2022-07-14 NOTE — ANESTHESIA PREPROCEDURE EVALUATION
Anesthesia Evaluation     Patient summary reviewed and Nursing notes reviewed   history of anesthetic complications:  NPO Solid Status: > 8 hours  NPO Liquid Status: > 8 hours           Airway   Mallampati: II  TM distance: >3 FB  No difficulty expected  Dental - normal exam     Pulmonary - normal exam    breath sounds clear to auscultation  (+) asthma,    ROS comment: Used her Albuterol pre proceudre  Cardiovascular - negative cardio ROS and normal exam    Rhythm: regular  Rate: normal        Neuro/Psych- negative ROS  GI/Hepatic/Renal/Endo    (+)  GERD,      Musculoskeletal (-) negative ROS    Abdominal  - normal exam   Substance History - negative use     OB/GYN negative ob/gyn ROS         Other                      Anesthesia Plan    ASA 3     MAC     intravenous induction     Anesthetic plan, risks, benefits, and alternatives have been provided, discussed and informed consent has been obtained with: patient.        CODE STATUS:

## 2022-07-17 LAB — BACTERIA SPEC AEROBE CULT: ABNORMAL

## 2022-07-18 LAB — REF LAB TEST METHOD: NORMAL

## 2022-12-21 ENCOUNTER — OFFICE VISIT (OUTPATIENT)
Dept: CARDIOLOGY | Facility: CLINIC | Age: 47
End: 2022-12-21

## 2022-12-21 VITALS
DIASTOLIC BLOOD PRESSURE: 74 MMHG | WEIGHT: 172 LBS | HEIGHT: 68 IN | TEMPERATURE: 97.1 F | OXYGEN SATURATION: 98 % | SYSTOLIC BLOOD PRESSURE: 136 MMHG | BODY MASS INDEX: 26.07 KG/M2 | HEART RATE: 66 BPM

## 2022-12-21 DIAGNOSIS — I10 ESSENTIAL HYPERTENSION: ICD-10-CM

## 2022-12-21 DIAGNOSIS — R07.89 CHEST PRESSURE: ICD-10-CM

## 2022-12-21 DIAGNOSIS — R06.09 DYSPNEA ON EXERTION: ICD-10-CM

## 2022-12-21 DIAGNOSIS — R94.31 ABNORMAL EKG: Primary | ICD-10-CM

## 2022-12-21 DIAGNOSIS — Z82.49 FAMILY HISTORY OF EARLY CAD: ICD-10-CM

## 2022-12-21 PROCEDURE — 93000 ELECTROCARDIOGRAM COMPLETE: CPT | Performed by: INTERNAL MEDICINE

## 2022-12-21 PROCEDURE — 99204 OFFICE O/P NEW MOD 45 MIN: CPT | Performed by: INTERNAL MEDICINE

## 2022-12-21 RX ORDER — ALBUTEROL SULFATE 90 UG/1
2 AEROSOL, METERED RESPIRATORY (INHALATION) EVERY 6 HOURS PRN
COMMUNITY
Start: 2022-11-22

## 2022-12-21 RX ORDER — PLECANATIDE 3 MG/1
1 TABLET ORAL DAILY
COMMUNITY
Start: 2022-12-06

## 2022-12-21 RX ORDER — METOPROLOL SUCCINATE AND HYDROCHLOROTHIAZIDE 25; 12.5 MG/1; MG/1
TABLET ORAL
COMMUNITY
Start: 2022-11-17 | End: 2022-12-21

## 2022-12-21 RX ORDER — LACTOBACIL 2/BIFIDO 1/S.THERMO 450B CELL
2 PACKET (EA) ORAL DAILY
COMMUNITY
Start: 2022-10-13 | End: 2022-12-21

## 2022-12-21 RX ORDER — METOPROLOL SUCCINATE 25 MG/1
TABLET, EXTENDED RELEASE ORAL
COMMUNITY
Start: 2022-11-17

## 2022-12-21 RX ORDER — NORETHINDRONE ACETATE AND ETHINYL ESTRADIOL 1MG-20(21)
1 KIT ORAL DAILY
COMMUNITY
Start: 2022-10-01

## 2022-12-21 NOTE — PROGRESS NOTES
Estee Owens  47 y.o. female    12/21/2022  1. Abnormal EKG    2. Essential hypertension    3. Chest pressure    4. Dyspnea on exertion    5. Family history of early CAD        History of Present Illness  Estee Owens is a 47-year-old female who is being seen by me for the first time as referred by her primary care physician.  She was seen at [facility] urgent care on 11/17/2022 for markedly elevated blood pressure and some degree of chest pressure/pain and dyspnea.  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   She was started on metoprolol succinate 25 mg daily and this along with spironolactone that she has been taking, as prescribed by her dermatologist, her blood pressure has been under good control.  Her headaches have resolved.  Her risk factors for coronary artery disease include positive family history for CAD with CAD in her father, paternal uncle, paternal grandfather and older brother.  She has no previous documented coronary artery disease or valvular heart disease.    She is a non-smoker and her LDL was 96 when checked in June 2022.    EKG today showed sinus rhythm with sinus arrhythmia with no ST-T wave changes diagnostic of ischemia.    SUBJECTIVE    Allergies   Allergen Reactions   • Cefpodoxime Hives   • Clavulanic Acid Other (See Comments)   • Metronidazole Hives   • Rifaximin Other (See Comments)   • Augmentin [Amoxicillin-Pot Clavulanate] Nausea Only   • Biaxin [Clarithromycin] Nausea Only         Past Medical History:   Diagnosis Date   • Asthma    • Gallstones    • PONV (postoperative nausea and vomiting)    • Seasonal allergies          Past Surgical History:   Procedure Laterality Date   • CHOLECYSTECTOMY WITH INTRAOPERATIVE CHOLANGIOGRAM N/A 1/30/2020    Procedure: CHOLECYSTECTOMY  "LAPAROSCOPIC INTRAOPERATIVE CHOLANGIOGRAM      (C-ARM#2)        (ANESTHESIA REQUEST JEANNINE OR TIFFANIE, NO ANESTHESIA STUDENTS);  Surgeon: Franky Max MD;  Location: [facility] OR;  Service: General   • COLONOSCOPY N/A 7/14/2022    Procedure: COLONOSCOPY 4:00;  Surgeon: Saad Lizarraga DO;  Location: [facility] ENDOSCOPY;  Service: Gastroenterology;  Laterality: N/A;   • DILATATION AND CURETTAGE     • ENDOSCOPY N/A 6/24/2020    Procedure: ESOPHAGOGASTRODUODENOSCOPY;  Surgeon: Saad Lizarraga DO;  Location: [facility] ENDOSCOPY;  Service: Gastroenterology;  Laterality: N/A;   • ENDOSCOPY N/A 7/14/2022    Procedure: ESOPHAGOGASTRODUODENOSCOPY 4:00;  Surgeon: Saad Lizarraga DO;  Location: [facility] ENDOSCOPY;  Service: Gastroenterology;  Laterality: N/A;         Family History   Problem Relation Age of Onset   • Heart disease Father    • Cancer Maternal Grandmother    • Hypertension Maternal Grandfather    • Heart disease Paternal Grandfather          Social History     Socioeconomic History   • Marital status:    Tobacco Use   • Smoking status: Never   • Smokeless tobacco: Never   Vaping Use   • Vaping Use: Never used   Substance and Sexual Activity   • Alcohol use: Never   • Drug use: Never   • Sexual activity: Defer         Current Outpatient Medications   Medication Sig Dispense Refill   • albuterol sulfate  (90 Base) MCG/ACT inhaler Inhale 2 puffs Every 6 (Six) Hours As Needed.     • Blisovi FE 1/20 1-20 MG-MCG per tablet Take 1 tablet by mouth Daily.     • metoprolol succinate XL (TOPROL-XL) 25 MG 24 hr tablet      • spironolactone (ALDACTONE) 50 MG tablet Take 50 mg by mouth Daily.     • Trulance 3 MG tablet Take 1 tablet by mouth Daily.       No current facility-administered medications for this visit.         OBJECTIVE    /74 (BP Location: Left arm, Patient Position: Sitting, Cuff Size: Adult)   Pulse 66   Temp 97.1 °F (36.2 °C)   Ht 172.7 cm (68\")   Wt 78 kg (172 lb)   SpO2 98%   " BMI 26.15 kg/m²         Review of Systems     Constitutional:  Denies recent weight loss, weight gain, fever or chills, no change in exercise tolerance     HENT:  Denies any hearing loss, epistaxis, hoarseness, or difficulty speaking.     Eyes: Wears eyeglasses or contact lenses     Respiratory:  Dyspnea with exertion, no cough, wheezing, or hemoptysis.     Cardiovascular: See HPI.    Gastrointestinal:  Denies change in bowel habits, dyspepsia, ulcer disease, hematochezia, or melena.     Endocrine: Negative for cold intolerance, heat intolerance, polydipsia, polyphagia and polyuria.     Genitourinary: Negative.      Musculoskeletal: Denies any history of arthritic symptoms or back problems     Skin:  Denies any change in hair or nails, rashes, or skin lesions.     Allergic/Immunologic: Negative.  Negative for environmental allergies, food allergies and/or immunocompromised state.     Neurological:  Denies any history of recurrent headaches, strokes, TIA, or seizure disorder.     Hematological: Denies any food allergies, seasonal allergies, bleeding disorders, or lymphadenopathy.     Psychiatric/Behavioral: Denies any history of depression, substance abuse, or change in cognitive function.       Physical Exam     Constitutional: Cooperative, alert and oriented, well-developed, well-nourished, in no acute distress.     HENT:   Head: Normocephalic, normal hair patterns, no masses or tenderness.  Ears, Nose, and Throat: No gross abnormalities. No pallor or cyanosis. Dentition good.   Eyes: EOMS intact, PERRL, conjunctivae and lids unremarkable. Fundoscopic exam and visual fields not performed.   Neck: No palpable masses or adenopathy, no thyromegaly, no JVD, carotid pulses are full and equal bilaterally and without bruit.     Cardiovascular: Regular rhythm, S1 and S2 normal, no S3 or S4.  No murmurs, gallops, or rubs detected.     Pulmonary/Chest: Chest: normal symmetry, normal respiratory excursion, no intercostal  retraction, no use of accessory muscles.     Pulmonary: Normal breath sounds. No rales or rhonchi.    Abdominal: Abdomen soft, bowel sounds normoactive, no masses, no hepatosplenomegaly, nontender, no bruit.     Musculoskeletal: No deformities, clubbing, cyanosis, erythema, or edema observed.     Neurological: No gross motor or sensory deficits noted, affect appropriate, oriented to time, person, place.     Skin: Warm and dry to the touch, no apparent skin lesion or mass noted.     Psychiatric: She has a normal mood and affect. Her behavior is normal. Judgment and thought content normal.         Procedures      Lab Results   Component Value Date    WBC 11.1 (H) 01/01/2019    HGB 13.6 01/01/2019    HCT 40.4 01/01/2019    MCV 90 01/01/2019     01/01/2019     Lab Results   Component Value Date    BUN 12 07/23/2022    CREATININE 0.8 07/23/2022    EGFRIFAFRI 93 06/15/2021    CO2 26 07/23/2022    CALCIUM 8.7 07/23/2022    ALBUMIN 3.9 07/23/2022    AST 14 07/23/2022    ALT 12 07/23/2022     Lab Results   Component Value Date    CHOL 215 (H) 06/29/2020    CHOL 248 (H) 01/01/2019     Lab Results   Component Value Date    TRIG 200 (H) 06/21/2022    TRIG 175 (H) 06/15/2021    TRIG 229 (H) 06/29/2020     Lab Results   Component Value Date    HDL 49 06/21/2022    HDL 56 06/15/2021    HDL 57 06/29/2020     No components found for: LDLCALC  Lab Results   Component Value Date    LDL 96 06/21/2022     06/15/2021     (H) 06/29/2020     No results found for: HDLLDLRATIO  No components found for: CHOLHDL  No results found for: HGBA1C  Lab Results   Component Value Date    TSH 1.06 12/30/2021    T2SBUTI 9.69 12/30/2021           ASSESSMENT AND PLAN  Estee Owens is a 47-year-old female with positive family history for CAD, recently diagnosed hypertension which is under control now with medications, who is here for cardiac evaluation.  As mentioned above, she was seen in the emergency room for uncontrolled blood  pressure, chest pressure/pain, dyspnea and EKG did show poor R wave progression anteroseptal leads.  She has not had any further episodes of chest pain.    In view of her risk factors, I believe that further restratification would be appropriate.  An exercise treadmill stress test has been arranged and an echocardiogram to assess left ventricular and valvular function has been arranged.  I have continued her current combination of metoprolol succinate and spironolactone since this seems to be controlling her blood pressure adequately.  We could consider an ARB in the future if her blood pressure is elevated.  Risk factor modification recommended.  Thank you for asked me to see this patient.  Further recommendations will follow.      Diagnoses and all orders for this visit:    1. Abnormal EKG (Primary)  -     ECG 12 Lead  -     Treadmill Stress Test; Future  -     Adult Transthoracic Echo Complete w/ Color, Spectral and Contrast if Necessary Per Protocol; Future    2. Essential hypertension  -     Treadmill Stress Test; Future  -     Adult Transthoracic Echo Complete w/ Color, Spectral and Contrast if Necessary Per Protocol; Future    3. Chest pressure  -     Treadmill Stress Test; Future  -     Adult Transthoracic Echo Complete w/ Color, Spectral and Contrast if Necessary Per Protocol; Future    4. Dyspnea on exertion  -     Treadmill Stress Test; Future  -     Adult Transthoracic Echo Complete w/ Color, Spectral and Contrast if Necessary Per Protocol; Future    5. Family history of early CAD  -     Treadmill Stress Test; Future  -     Adult Transthoracic Echo Complete w/ Color, Spectral and Contrast if Necessary Per Protocol; Future        BMI is >= 25 and <30. (Overweight) The following options were offered after discussion;: nutrition counseling/recommendations      Estee Tateomari Owens  reports that she has never smoked. She has never used smokeless tobacco.          Shira Nava,  MD  12/21/2022  10:24 CST

## 2022-12-22 LAB
QT INTERVAL: 396 MS
QTC INTERVAL: 415 MS

## 2023-01-24 ENCOUNTER — TRANSCRIBE ORDERS (OUTPATIENT)
Dept: GENERAL RADIOLOGY | Facility: CLINIC | Age: 48
End: 2023-01-24

## 2023-01-24 DIAGNOSIS — K59.00 CONSTIPATION, UNSPECIFIED CONSTIPATION TYPE: Primary | ICD-10-CM

## 2023-03-23 ENCOUNTER — OFFICE VISIT (OUTPATIENT)
Dept: PODIATRY | Facility: CLINIC | Age: 48
End: 2023-03-23
Payer: COMMERCIAL

## 2023-03-23 VITALS
SYSTOLIC BLOOD PRESSURE: 130 MMHG | DIASTOLIC BLOOD PRESSURE: 89 MMHG | WEIGHT: 171 LBS | OXYGEN SATURATION: 98 % | BODY MASS INDEX: 25.91 KG/M2 | HEART RATE: 87 BPM | HEIGHT: 68 IN

## 2023-03-23 DIAGNOSIS — M79.671 RIGHT FOOT PAIN: ICD-10-CM

## 2023-03-23 DIAGNOSIS — M72.2 PLANTAR FASCIITIS: Primary | ICD-10-CM

## 2023-03-23 PROCEDURE — 99203 OFFICE O/P NEW LOW 30 MIN: CPT | Performed by: NURSE PRACTITIONER

## 2023-03-23 PROCEDURE — 20605 DRAIN/INJ JOINT/BURSA W/O US: CPT | Performed by: NURSE PRACTITIONER

## 2023-03-23 NOTE — PROGRESS NOTES
Estee Owens  1975  47 y.o. female        03/23/2023    Chief Complaint   Patient presents with   • Right Foot - Pain       History of Present Illness    Estee Owens is a 47 y.o.female came to clinic for concern of right foot pain. Xray obtained today.     47-year-old  female is in the office today for an evaluation of her foot pain.  She denies any known traumatic injury but reports after she started a routine home exercise program, treadmill that her symptoms progressively worsen    Past Medical History:   Diagnosis Date   • Asthma    • Gallstones    • PONV (postoperative nausea and vomiting)    • Seasonal allergies          Past Surgical History:   Procedure Laterality Date   • CHOLECYSTECTOMY WITH INTRAOPERATIVE CHOLANGIOGRAM N/A 1/30/2020    Procedure: CHOLECYSTECTOMY LAPAROSCOPIC INTRAOPERATIVE CHOLANGIOGRAM      (C-ARM#2)        (ANESTHESIA REQUEST JEANNINE OR TIFFANIE, NO ANESTHESIA STUDENTS);  Surgeon: Franky Max MD;  Location: [facility] OR;  Service: General   • COLONOSCOPY N/A 7/14/2022    Procedure: COLONOSCOPY 4:00;  Surgeon: Saad Lizarraga DO;  Location: [facility] ENDOSCOPY;  Service: Gastroenterology;  Laterality: N/A;   • DILATATION AND CURETTAGE     • ENDOSCOPY N/A 6/24/2020    Procedure: ESOPHAGOGASTRODUODENOSCOPY;  Surgeon: Saad Lizarraga DO;  Location: [facility] ENDOSCOPY;  Service: Gastroenterology;  Laterality: N/A;   • ENDOSCOPY N/A 7/14/2022    Procedure: ESOPHAGOGASTRODUODENOSCOPY 4:00;  Surgeon: Saad Lizarraga DO;  Location: [facility] ENDOSCOPY;  Service: Gastroenterology;  Laterality: N/A;         Family History   Problem Relation Age of Onset   • Heart disease Father    • Cancer Maternal Grandmother    • Hypertension Maternal Grandfather    • Heart disease Paternal Grandfather        Allergies   Allergen Reactions   • Cefpodoxime Hives   • Clavulanic Acid Other (See Comments)   • Metronidazole Hives   • Rifaximin Other (See Comments)  "  • Augmentin [Amoxicillin-Pot Clavulanate] Nausea Only   • Biaxin [Clarithromycin] Nausea Only       Social History     Socioeconomic History   • Marital status:    Tobacco Use   • Smoking status: Never   • Smokeless tobacco: Never   Vaping Use   • Vaping Use: Never used   Substance and Sexual Activity   • Alcohol use: Never   • Drug use: Never   • Sexual activity: Defer         Current Outpatient Medications   Medication Sig Dispense Refill   • albuterol sulfate  (90 Base) MCG/ACT inhaler Inhale 2 puffs Every 6 (Six) Hours As Needed.     • Blisovi FE 1/20 1-20 MG-MCG per tablet Take 1 tablet by mouth Daily.     • metoprolol succinate XL (TOPROL-XL) 25 MG 24 hr tablet      • spironolactone (ALDACTONE) 50 MG tablet Take 1 tablet by mouth Daily.     • Trulance 3 MG tablet Take 1 tablet by mouth Daily.       No current facility-administered medications for this visit.       Review of Systems   Musculoskeletal:        Foot pain    All other systems reviewed and are negative.        OBJECTIVE    /89   Pulse 87   Ht 172.7 cm (67.99\")   Wt 77.6 kg (171 lb)   SpO2 98%   BMI 26.01 kg/m²     Body mass index is 26.01 kg/m².        Physical Exam  Vitals reviewed.   Constitutional:       Appearance: Normal appearance. She is well-developed.   HENT:      Head: Normocephalic and atraumatic.   Neck:      Trachea: Trachea and phonation normal.   Cardiovascular:      Pulses:           Dorsalis pedis pulses are 2+ on the right side and 2+ on the left side.        Posterior tibial pulses are 2+ on the right side and 2+ on the left side.   Pulmonary:      Effort: Pulmonary effort is normal. No respiratory distress.   Abdominal:      General: There is no distension.      Palpations: Abdomen is soft.   Musculoskeletal:        Feet:    Feet:      Right foot:      Protective Sensation: 10 sites tested. 10 sites sensed.      Skin integrity: Skin integrity normal.      Toenail Condition: Right toenails are normal.    "   Left foot:      Protective Sensation: 10 sites tested. 10 sites sensed.      Skin integrity: Skin integrity normal.      Toenail Condition: Left toenails are normal.   Skin:     General: Skin is warm and dry.   Neurological:      Mental Status: She is alert and oriented to person, place, and time.      GCS: GCS eye subscore is 4. GCS verbal subscore is 5. GCS motor subscore is 6.   Psychiatric:         Speech: Speech normal.         Behavior: Behavior normal. Behavior is cooperative.         Thought Content: Thought content normal.         Judgment: Judgment normal.          Radiology-reviewed x-rays of the feet      Procedures  Right Plantar Fasciits Injection  Date/Time: 03/23/2023  Performed by: Peter Pinedo  Authorized by: Peter Pinedo   Consent: Verbal consent obtained. Written consent obtained.  Risks and benefits: risks, benefits and alternatives were discussed  Consent given by: patient  Patient identity confirmed: verbally with patient  Indications: pain relief    Sedation:  Patient sedated: no    Patient position: sitting  Needle size: 27 G  Local anesthetic: 0.5% Marcaine plain, Kenalog 40 mg/ml , Decadron 4 mg/mL.   Outcome: pain improved  Patient tolerance: Patient tolerated the procedure well with no immediate complications      ASSESSMENT AND PLAN    Diagnoses and all orders for this visit:    1. Plantar fasciitis (Primary)  -     XR foot 3+ vw bilateral; Future  -     triamcinolone acetonide (KENALOG) injection 10 mg    2. Right foot pain      Body mass index is 26.01 kg/m².    Recommend follow-up with primary care to discuss BMI greater than 30    After risk benefits and treatment options was discussed with the patient in detail and proceeded with plantar fascia injection which she tolerated well.  She left ambulatory and she was given information on over-the-counter inserts, guided home exercise program, ice and other treatment options.  She will follow-up in 2 weeks for recheck or sooner for  worsening.          This document has been electronically signed by Peter WALTERS FNP-C, ONP-C on March 23, 2023 16:33 CDT

## 2023-07-24 ENCOUNTER — OFFICE VISIT (OUTPATIENT)
Dept: PODIATRY | Facility: CLINIC | Age: 48
End: 2023-07-24
Payer: COMMERCIAL

## 2023-07-24 VITALS
HEART RATE: 86 BPM | WEIGHT: 171 LBS | DIASTOLIC BLOOD PRESSURE: 84 MMHG | SYSTOLIC BLOOD PRESSURE: 135 MMHG | BODY MASS INDEX: 25.91 KG/M2 | HEIGHT: 68 IN | OXYGEN SATURATION: 99 %

## 2023-07-24 DIAGNOSIS — M72.2 PLANTAR FASCIITIS: Primary | ICD-10-CM

## 2023-07-24 DIAGNOSIS — M79.671 RIGHT FOOT PAIN: ICD-10-CM

## 2023-07-24 PROCEDURE — 99213 OFFICE O/P EST LOW 20 MIN: CPT | Performed by: NURSE PRACTITIONER

## 2023-07-24 NOTE — PROGRESS NOTES
Estee Owens  1975  48 y.o. female    07/24/2023    Chief Complaint   Patient presents with    Right Foot - Pain, Follow-up       History of Present Illness    Estee Owens is a 48 y.o.female came to clinic for concern of right foot pain.    Past Medical History:   Diagnosis Date    Asthma     Gallstones     PONV (postoperative nausea and vomiting)     Seasonal allergies          Past Surgical History:   Procedure Laterality Date    CHOLECYSTECTOMY WITH INTRAOPERATIVE CHOLANGIOGRAM N/A 1/30/2020    Procedure: CHOLECYSTECTOMY LAPAROSCOPIC INTRAOPERATIVE CHOLANGIOGRAM      (C-ARM#2)        (ANESTHESIA REQUEST JEANNINE OR TIFFANIE, NO ANESTHESIA STUDENTS);  Surgeon: Franky Max MD;  Location: [facility] OR;  Service: General    COLONOSCOPY N/A 7/14/2022    Procedure: COLONOSCOPY 4:00;  Surgeon: Saad Lizarraga DO;  Location: [facility] ENDOSCOPY;  Service: Gastroenterology;  Laterality: N/A;    DILATATION AND CURETTAGE      ENDOSCOPY N/A 6/24/2020    Procedure: ESOPHAGOGASTRODUODENOSCOPY;  Surgeon: Saad Lizarraga DO;  Location: [facility] ENDOSCOPY;  Service: Gastroenterology;  Laterality: N/A;    ENDOSCOPY N/A 7/14/2022    Procedure: ESOPHAGOGASTRODUODENOSCOPY 4:00;  Surgeon: Saad Lizarraga DO;  Location: [facility] ENDOSCOPY;  Service: Gastroenterology;  Laterality: N/A;         Family History   Problem Relation Age of Onset    Heart disease Father     Cancer Maternal Grandmother     Hypertension Maternal Grandfather     Heart disease Paternal Grandfather        Allergies   Allergen Reactions    Cefpodoxime Hives    Clavulanic Acid Other (See Comments)    Metronidazole Hives    Rifaximin Other (See Comments)    Augmentin [Amoxicillin-Pot Clavulanate] Nausea Only    Biaxin [Clarithromycin] Nausea Only       Social History     Socioeconomic History    Marital status:    Tobacco Use    Smoking status: Never    Smokeless tobacco: Never   Vaping Use    Vaping Use: Never used  "  Substance and Sexual Activity    Alcohol use: Never    Drug use: Never    Sexual activity: Defer         Current Outpatient Medications   Medication Sig Dispense Refill    albuterol sulfate  (90 Base) MCG/ACT inhaler Inhale 2 puffs Every 6 (Six) Hours As Needed.      Blisovi FE 1/20 1-20 MG-MCG per tablet Take 1 tablet by mouth Daily.      metoprolol succinate XL (TOPROL-XL) 25 MG 24 hr tablet       spironolactone (ALDACTONE) 50 MG tablet Take 1 tablet by mouth Daily.      Trulance 3 MG tablet Take 1 tablet by mouth Daily.       No current facility-administered medications for this visit.       Review of Systems   Musculoskeletal:         Foot pain    All other systems reviewed and are negative.      OBJECTIVE    /84   Pulse 86   Ht 172.7 cm (67.99\")   Wt 77.6 kg (171 lb)   SpO2 99%   BMI 26.01 kg/m²     Body mass index is 26.01 kg/m².        Physical Exam  Vitals reviewed.   Constitutional:       Appearance: Normal appearance. She is well-developed.   HENT:      Head: Normocephalic and atraumatic.   Neck:      Trachea: Trachea and phonation normal.   Cardiovascular:      Pulses:           Dorsalis pedis pulses are 2+ on the right side and 2+ on the left side.        Posterior tibial pulses are 2+ on the right side and 2+ on the left side.   Pulmonary:      Effort: Pulmonary effort is normal. No respiratory distress.   Abdominal:      General: There is no distension.      Palpations: Abdomen is soft.   Musculoskeletal:        Feet:    Feet:      Right foot:      Protective Sensation: 10 sites tested.  10 sites sensed.      Skin integrity: Skin integrity normal.      Toenail Condition: Right toenails are normal.      Left foot:      Protective Sensation: 10 sites tested.  10 sites sensed.      Skin integrity: Skin integrity normal.      Toenail Condition: Left toenails are normal.   Skin:     General: Skin is warm and dry.   Neurological:      Mental Status: She is alert and oriented to person, " place, and time.      GCS: GCS eye subscore is 4. GCS verbal subscore is 5. GCS motor subscore is 6.   Psychiatric:         Speech: Speech normal.         Behavior: Behavior normal. Behavior is cooperative.         Thought Content: Thought content normal.         Judgment: Judgment normal.        Radiology-reviewed x-rays of the feet      Procedures        ASSESSMENT AND PLAN    Diagnoses and all orders for this visit:    1. Plantar fasciitis (Primary)  -     MRI Foot Right Without Contrast; Future    2. Right foot pain  -     MRI Foot Right Without Contrast; Future      Body mass index is 26.01 kg/m².    Recommend follow-up with primary care to discuss BMI greater than 30    Reviewed x-rays again today and reexamine the patient.  She is still palpable tenderness over the plantar fascia despite the recent injection along with shoe gear changes guided home exercise program activity modification, ice and other modalities.  This is the second injection she has received over the past 5-month.  At this point I recommended that we proceed with an MRI of the right foot for further evaluation of her symptoms and the in the meantime she can contact the office for any worsening symptoms.          This document has been electronically signed by Peter WALTERS, FNP-FRANCOIS, ONP-C on July 24, 2023 08:22 CDT

## 2023-08-07 ENCOUNTER — TELEPHONE (OUTPATIENT)
Dept: PODIATRY | Facility: CLINIC | Age: 48
End: 2023-08-07
Payer: COMMERCIAL

## 2023-08-07 DIAGNOSIS — M72.2 PLANTAR FASCIITIS: ICD-10-CM

## 2023-08-07 DIAGNOSIS — M79.671 RIGHT FOOT PAIN: Primary | ICD-10-CM

## 2023-08-07 NOTE — TELEPHONE ENCOUNTER
MA's       Ms Owens is agreeable to therapy. She mentioned [employer] if that is an option.  She wants to know if someone will call her to set up therapy.    She did make an appt in oct for an injection, and will cancel that if she doesn't need it.

## 2023-08-07 NOTE — TELEPHONE ENCOUNTER
TC       Ms Graciela states her MRI was refused/cancelled.     She would like a call back to discuss how to proceed now.     [phone]

## 2023-08-10 ENCOUNTER — HOSPITAL ENCOUNTER (OUTPATIENT)
Dept: PHYSICAL THERAPY | Facility: HOSPITAL | Age: 48
Setting detail: THERAPIES SERIES
Discharge: HOME | End: 2023-08-10
Payer: COMMERCIAL

## 2023-08-10 DIAGNOSIS — M79.671 RIGHT FOOT PAIN: ICD-10-CM

## 2023-08-10 DIAGNOSIS — M72.2 PLANTAR FASCIITIS: Primary | ICD-10-CM

## 2023-08-10 PROCEDURE — 97140 MANUAL THERAPY 1/> REGIONS: CPT | Performed by: PHYSICAL THERAPIST

## 2023-08-10 PROCEDURE — 97161 PT EVAL LOW COMPLEX 20 MIN: CPT | Performed by: PHYSICAL THERAPIST

## 2023-08-10 NOTE — THERAPY EVALUATION
Outpatient Physical Therapy Ortho Initial Evaluation  [facility]     Patient Name: Estee Owens  : 1975  MRN: [id number]  Today's Date: 8/10/2023      Visit Date: 08/10/2023  Attendance:   (90 approved)  Subjective % Improvement: n/a  Recert Date: 10/1/23  MD appointment: tbd    Therapy Diagnosis: PF    Patient Active Problem List   Diagnosis    Cholecystitis    Gastroesophageal reflux disease without esophagitis    Pain of upper abdomen    Encounter for screening for malignant neoplasm of colon    Bacterial enteritis    Essential hypertension    Chest pressure    Dyspnea on exertion    Family history of early CAD        Past Medical History:   Diagnosis Date    Asthma     Gallstones     PONV (postoperative nausea and vomiting)     Seasonal allergies         Past Surgical History:   Procedure Laterality Date    CHOLECYSTECTOMY WITH INTRAOPERATIVE CHOLANGIOGRAM N/A 2020    Procedure: CHOLECYSTECTOMY LAPAROSCOPIC INTRAOPERATIVE CHOLANGIOGRAM      (C-ARM#2)        (ANESTHESIA REQUEST JEANNINE OR TIFFANIE, NO ANESTHESIA STUDENTS);  Surgeon: Franky Max MD;  Location: [facility] OR;  Service: General    COLONOSCOPY N/A 2022    Procedure: COLONOSCOPY 4:00;  Surgeon: Saad Lizarraga DO;  Location: [facility] ENDOSCOPY;  Service: Gastroenterology;  Laterality: N/A;    DILATATION AND CURETTAGE      ENDOSCOPY N/A 2020    Procedure: ESOPHAGOGASTRODUODENOSCOPY;  Surgeon: Saad Lizarraga DO;  Location: [facility] ENDOSCOPY;  Service: Gastroenterology;  Laterality: N/A;    ENDOSCOPY N/A 2022    Procedure: ESOPHAGOGASTRODUODENOSCOPY 4:00;  Surgeon: Saad Lizarraga DO;  Location: [facility] ENDOSCOPY;  Service: Gastroenterology;  Laterality: N/A;       Visit Dx:     ICD-10-CM ICD-9-CM   1. Plantar fasciitis  M72.2 728.71   2. Right foot pain  M79.671 729.5          Patient History       Row Name 08/10/23 0850             History    Brief Description of Current Complaint Present  today with reports of being told has PF. Started walking on a treadmill in January then March started hurting and had a shot that improved until June. Then June pain return with additional shot and no improvement. Primary right pain. Stretches and ices. Power steps have assisted.  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 mild pes planus, skin in tact. No antalgics noted  -BB       Special Tests/Palpation    Special Tests/Palpation Ankle/Foot  -BB       Foot/Ankle Palpation    Foot/Ankle Palpation? Yes  -BB    Plantar Fascia Crepitus;Guarded/taut  -BB    Medial Gastroc Guarded/taut;Tender  -BB    Lateral Gastroc Guarded/taut;Tender  -BB    Soleus Guarded/taut;Tender  -BB       General ROM    GENERAL ROM COMMENTS WNL: good DF mobility  -BB       MMT (Manual Muscle Testing)    General MMT Comments ankle is grossly WNL  -BB       Sensation    Sensation WNL? WNL  -BB              User Key  (r) = Recorded By, (t) = Taken By, (c) = Cosigned By      Initials Name Provider Type    Cecilia Ansari PT DPT Physical Therapist                                       PT OP Goals       Row Name 08/10/23 0850          PT Short Term Goals    STG Date to Achieve 08/31/23  -BB     STG 1 Subjective reports of feeling 80% improved  -BB     STG 2 subjective reports of <2/10 of ttp gastroc/soleus  -BB     STG 3 Complete a 6' walk test without  increased pain noted  -BB        Time Calculation    PT Goal Re-Cert Due Date 08/31/23  -BB               User Key  (r) = Recorded By, (t) = Taken By, (c) = Cosigned By      Initials Name Provider Type    Cecilia Ansari PT DPT Physical Therapist                     PT Assessment/Plan       Row Name 08/10/23 0850          PT Assessment    Functional Limitations Impaired gait;Limitations in community activities;Limitations in functional capacity and performance  -BB     Impairments Gait;Pain;Impaired muscle length;Impaired muscle endurance  -BB     Assessment Comments Patient presents today with reports of PF in right foot of many months. Patient is noted to have good foot/ankle ROM, has mild pes planus with good joint mobility. Is noted to have tenderness of the PF and gastroc/soleus distally. Given small heel lifts to assist load on heels and educated to remove as needed. Patient could continue to benefit from skilled PT To improve tissue mobility and pain to restore normal mobility without pain.  -BB     Rehab Potential Good  -BB     Patient/caregiver participated in establishment of treatment plan and goals Yes  -BB     Patient would benefit from skilled therapy intervention Yes  -BB        PT Plan    PT Frequency 2x/week  -BB     Predicted Duration of Therapy Intervention (PT) 3-4 weeks  -BB     Planned CPT's? PT THER SUPP EA 15 MIN;PT INITIAL ORTHOTIC MGMT/TRAIN EA 15 MIN: 98316;PT MANUAL THERAPY EA 15 MIN: 88234;PT RE-EVAL: 32132;PT THER PROC EA 15 MIN: 16688;PT THER ACT EA 15 MIN: 22217;PT EVAL LOW COMPLEXITY: 60862;PT ULTRASOUND EA 15 MIN: 56167  -BB     PT Plan Comments stretching, strength, gait, balance, soft tissue to improve PF and gastroc. No PRO 2.  -BB               User Key  (r) = Recorded By, (t) = Taken By, (c) = Cosigned By      Initials Name Provider Type    Cecilia Ansari PT DPT Physical Therapist                       OP Exercises       Row Name 08/10/23 0876             Subjective  Comments    Subjective Comments see pt hx  -BB         Subjective Pain    Able to rate subjective pain? yes  -BB      Pre-Treatment Pain Level 0  -BB      Post-Treatment Pain Level 0  -BB         Exercise 1    Exercise Name 1 eval/POC  -BB         Exercise 2    Exercise Name 2 MFR to PF and gastroc  -BB      Time 2 15'  -BB         Exercise 3    Exercise Name 3 small 2 layer lift given each shoe to assist heel pressure  -BB                User Key  (r) = Recorded By, (t) = Taken By, (c) = Cosigned By      Initials Name Provider Type    Cecilia Ansari PT DPT Physical Therapist                                            Time Calculation:     Start Time: 0850  Stop Time: 0927  Time Calculation (min): 37 min     Therapy Charges for Today       Code Description Service Date Service Provider Modifiers Qty    69482742642  PT EVAL LOW COMPLEXITY 2 8/10/2023 Cecilia Dallas PT DPT GP 1    [id number]  PT MANUAL THERAPY EA 15 MIN 8/10/2023 Cecilia Dallas PT DPT GP 1                      Cecilia Dallas PT DPT  8/10/2023

## 2023-08-15 ENCOUNTER — HOSPITAL ENCOUNTER (OUTPATIENT)
Dept: PHYSICAL THERAPY | Facility: HOSPITAL | Age: 48
Setting detail: THERAPIES SERIES
Discharge: HOME | End: 2023-08-15
Payer: COMMERCIAL

## 2023-08-15 DIAGNOSIS — M72.2 PLANTAR FASCIITIS: Primary | ICD-10-CM

## 2023-08-15 DIAGNOSIS — M79.671 RIGHT FOOT PAIN: ICD-10-CM

## 2023-08-15 PROCEDURE — 97110 THERAPEUTIC EXERCISES: CPT

## 2023-08-15 PROCEDURE — 97535 SELF CARE MNGMENT TRAINING: CPT

## 2023-08-15 PROCEDURE — 97140 MANUAL THERAPY 1/> REGIONS: CPT

## 2023-08-15 NOTE — THERAPY TREATMENT NOTE
Outpatient Physical Therapy Ortho Treatment Note  [facility]     Patient Name: Estee Owens  : 1975  MRN: [id number]  Today's Date: 2023    Visit attendance:  approved by insurance  % Improved: abigail CHAVEZ appt: abigail  Recert due date: 23    Therapy diagnosis: R foot pain, PF    Visit Date: 08/15/2023    Visit Dx:    ICD-10-CM ICD-9-CM   1. Plantar fasciitis  M72.2 728.71   2. Right foot pain  M79.671 729.5       Patient Active Problem List   Diagnosis    Cholecystitis    Gastroesophageal reflux disease without esophagitis    Pain of upper abdomen    Encounter for screening for malignant neoplasm of colon    Bacterial enteritis    Essential hypertension    Chest pressure    Dyspnea on exertion    Family history of early CAD        Past Medical History:   Diagnosis Date    Asthma     Gallstones     PONV (postoperative nausea and vomiting)     Seasonal allergies         Past Surgical History:   Procedure Laterality Date    CHOLECYSTECTOMY WITH INTRAOPERATIVE CHOLANGIOGRAM N/A 2020    Procedure: CHOLECYSTECTOMY LAPAROSCOPIC INTRAOPERATIVE CHOLANGIOGRAM      (C-ARM#2)        (ANESTHESIA REQUEST JEANNINE OR TIFFANIE, NO ANESTHESIA STUDENTS);  Surgeon: Franky Max MD;  Location: [facility] OR;  Service: General    COLONOSCOPY N/A 2022    Procedure: COLONOSCOPY 4:00;  Surgeon: Saad Lizarraga DO;  Location: [facility] ENDOSCOPY;  Service: Gastroenterology;  Laterality: N/A;    DILATATION AND CURETTAGE      ENDOSCOPY N/A 2020    Procedure: ESOPHAGOGASTRODUODENOSCOPY;  Surgeon: Saad Lizarraga DO;  Location: [facility] ENDOSCOPY;  Service: Gastroenterology;  Laterality: N/A;    ENDOSCOPY N/A 2022    Procedure: ESOPHAGOGASTRODUODENOSCOPY 4:00;  Surgeon: Saad Lizarraga DO;  Location: [facility] ENDOSCOPY;  Service: Gastroenterology;  Laterality: N/A;        PT Ortho       Row Name 08/15/23 2860       Subjective Comments    Subjective Comments Pt tried heel inserts in  "[place]. Feels a little better. Pt tired to walk [place] 30 minutes; stopped with increased Pain R heel. She walks slower; depressed over decreased activity, less in shape.Able to move daughter into school  from 10 to 2:30.   Pain increased to 7/10.  Had to sit down. Pain at rest 0/10 arriving in PT. Pt feels the manual work in PT was helpful on eval.  -       Subjective Pain    Able to rate subjective pain? yes  -    Pre-Treatment Pain Level 0  -    Post-Treatment Pain Level 0  -       Posture/Observations    Posture/Observations Comments wearing orthotics in [place] shoes  -       General ROM    GENERAL ROM COMMENTS closed chain knee to wall ankle DF 3\" symmetrical R involved to L;  -       MMT (Manual Muscle Testing)    General MMT Comments poor motor intrinsic control unable to extend R great toe with  remaining toes on floor and lifting remaining toes with great toe on floor;  weakness of R hip stabilization abduction 4/5 compared to L uninvolved 5/5;  B heel rise gastroc 6\" lift compared to single with  HR with light UE support on L uninvovled 5\"  only able to  calf raise on R involved single 4 and 1/2 inches;  poor eccentric control on R with \"flop\" down  -              User Key  (r) = Recorded By, (t) = Taken By, (c) = Cosigned By      Initials Name Provider Type     Rozina Christianson, PT Physical Therapist                                  08/15/23 1150   PT Short Term Goals   STG Date to Achieve 08/31/23   STG 1 Subjective reports of feeling 80% improved   STG 2 subjective reports of <2/10 of ttp gastroc/soleus   STG 3 Complete a 6' walk test without increased pain noted   Long Term Goals   LTG Date to Achieve 08/31/23   LTG 1 added on addendum:   Pt will demo 5/5 R hip stabilization strength abduction MMT in SL   LTG 2 Pt will demo standing single R  heel rise light UE assist for balance to 6\"  from floor symmetrical to  to B heel rise 6\"  to demo improved gastroc/soleus strength   LTG 3 Pt " able to complete single single R heel rise with light UE support for balance and control eccentrically without foot flop lowering   LTG 4 Pt will demo ability to perform seated and standing toe yoga on R involved foot to demo improved intrinsic control   LTG 5 Pt [place] 30 minutes without increased symptoms to demo initiation to PLOF   Time Calculation                                        Therapy Education  Education Details: [email or web address] ex prescription creation c pt ed in performing daily.  Work up to 2 sets of ex after a wk of performing without increased pain.  Pt required intermittent vc, demo, written instruction for proper tecnhique including:  SL  knee plank with top leg hip abd,  soleus stretch, seated toe yoga flex, ext; seated R foot toe flexion with supination/post tib heel on floor into toe extension, standing B CR shift wt R lower eccentric on R; PT instruction of pt trying another form of ex right now like bike or swim as she is unable to walk without pain; continue ice to foot and rolling frozen water bottle to foot for pain reduction  Given: HEP, Symptoms/condition management  Program: New  How Provided: Verbal, Demonstration, Written  Provided to: Patient  Level of Understanding: Verbalized, Demonstrated      Addendum to adding LTG for pt care; Rozina Christianson PT            Time Calculation:   Start Time: 1150  Stop Time: 1239  Time Calculation (min): 49 min                Rozina Christianson, PT  8/17/2023

## 2023-08-16 ENCOUNTER — HOSPITAL ENCOUNTER (OUTPATIENT)
Dept: PHYSICAL THERAPY | Facility: HOSPITAL | Age: 48
Setting detail: THERAPIES SERIES
Discharge: HOME | End: 2023-08-16
Payer: COMMERCIAL

## 2023-08-16 DIAGNOSIS — M72.2 PLANTAR FASCIITIS: Primary | ICD-10-CM

## 2023-08-16 DIAGNOSIS — M79.671 RIGHT FOOT PAIN: ICD-10-CM

## 2023-08-16 PROCEDURE — 97140 MANUAL THERAPY 1/> REGIONS: CPT | Performed by: PHYSICAL THERAPIST

## 2023-08-16 PROCEDURE — 97035 APP MDLTY 1+ULTRASOUND EA 15: CPT | Performed by: PHYSICAL THERAPIST

## 2023-08-16 NOTE — THERAPY TREATMENT NOTE
Outpatient Physical Therapy Ortho Treatment Note  [facility]     Patient Name: Estee Owens  : 1975  MRN: [id number]  Today's Date: 2023      Visit Date: 2023  Attendance:  3/3 (90 approved)  Subjective % Improvement: not noted  Recert Date: 10/1/23  MD appointment: tbd     Therapy Diagnosis: PF  Visit Dx:    ICD-10-CM ICD-9-CM   1. Plantar fasciitis  M72.2 728.71   2. Right foot pain  M79.671 729.5       Patient Active Problem List   Diagnosis    Cholecystitis    Gastroesophageal reflux disease without esophagitis    Pain of upper abdomen    Encounter for screening for malignant neoplasm of colon    Bacterial enteritis    Essential hypertension    Chest pressure    Dyspnea on exertion    Family history of early CAD        Past Medical History:   Diagnosis Date    Asthma     Gallstones     PONV (postoperative nausea and vomiting)     Seasonal allergies         Past Surgical History:   Procedure Laterality Date    CHOLECYSTECTOMY WITH INTRAOPERATIVE CHOLANGIOGRAM N/A 2020    Procedure: CHOLECYSTECTOMY LAPAROSCOPIC INTRAOPERATIVE CHOLANGIOGRAM      (C-ARM#2)        (ANESTHESIA REQUEST JEANNINE OR TIFFANIE, NO ANESTHESIA STUDENTS);  Surgeon: Franky Max MD;  Location: [facility] OR;  Service: General    COLONOSCOPY N/A 2022    Procedure: COLONOSCOPY 4:00;  Surgeon: Saad Lizarraga DO;  Location: [facility] ENDOSCOPY;  Service: Gastroenterology;  Laterality: N/A;    DILATATION AND CURETTAGE      ENDOSCOPY N/A 2020    Procedure: ESOPHAGOGASTRODUODENOSCOPY;  Surgeon: Saad Lizarraga DO;  Location: [facility] ENDOSCOPY;  Service: Gastroenterology;  Laterality: N/A;    ENDOSCOPY N/A 2022    Procedure: ESOPHAGOGASTRODUODENOSCOPY 4:00;  Surgeon: Saad Lizarraga DO;  Location: [facility] ENDOSCOPY;  Service: Gastroenterology;  Laterality: N/A;        PT Ortho       Row Name 23 1408       Subjective Comments    Subjective Comments Reports doing HEP and made hips  sore. Noticed foot did improve but moved daughter into college and noted increased pain to 7/10  -BB       Subjective Pain    Able to rate subjective pain? yes  -BB    Pre-Treatment Pain Level 0  -BB    Post-Treatment Pain Level 0  -BB       Posture/Observations    Posture/Observations Comments no antalgics noted  -BB              User Key  (r) = Recorded By, (t) = Taken By, (c) = Cosigned By      Initials Name Provider Type    BB Cecilia Dallas, PT DPT Physical Therapist                                 PT Assessment/Plan       Row Name 08/16/23 1405          PT Assessment    Assessment Comments Good tolerance treatment without increased pain. Crepitus and thumb size nodule noted mid PF. HEP progressed this date.  -BB        PT Plan    PT Frequency 2x/week  -BB     PT Plan Comments Continue US and manual, progress foot strength  -BB               User Key  (r) = Recorded By, (t) = Taken By, (c) = Cosigned By      Initials Name Provider Type    BB Cecilia Dallas, PT DPT Physical Therapist                     Modalities       Row Name 08/16/23 1405             Ultrasound 42960    Location PF  -BB      Frequency --  2.0  -BB      Intensity - Wts/cm 1.5  -BB      82299 - PT Ultrasound Minutes 8  -BB                User Key  (r) = Recorded By, (t) = Taken By, (c) = Cosigned By      Initials Name Provider Type    BB Cecilia Dallas, PT DPT Physical Therapist                   OP Exercises       Row Name 08/16/23 1403             Subjective Comments    Subjective Comments Reports doing HEP and made hips sore. Noticed foot did improve but moved daughter into college and noted increased pain to 7/10  -BB         Subjective Pain    Able to rate subjective pain? yes  -BB      Pre-Treatment Pain Level 0  -BB      Post-Treatment Pain Level 0  -BB         Exercise 1    Exercise Name 1 MFR to PF cross fx/opppsing thumbs/glides  -BB      Time 1 20'  -BB         Exercise 2    Exercise Name 2 see modalities  -BB         Exercise 3     "Exercise Name 3 Passive gastroc stretch with calcaneal distraction  -BB      Sets 3 3  -BB      Time 3 30\"  -BB         Exercise 4    Exercise Name 4 pron/sup rock  -BB      Sets 4 1  -BB      Reps 4 10  -BB      Additional Comments with knee block  -BB         Exercise 5    Exercise Name 5 gastroc/soelus stretch for HEP  -BB      Additional Comments reviewed  -BB                User Key  (r) = Recorded By, (t) = Taken By, (c) = Cosigned By      Initials Name Provider Type    Cecilia Ansari PT DPT Physical Therapist                                  PT OP Goals       Row Name 08/16/23 1405          PT Short Term Goals    STG Date to Achieve 08/31/23  -BB     STG 1 Subjective reports of feeling 80% improved  -BB     STG 1 Progress Not Met  -BB     STG 2 subjective reports of <2/10 of ttp gastroc/soleus  -BB     STG 2 Progress Not Met  -BB     STG 3 Complete a 6' walk test without increased pain noted  -BB     STG 3 Progress Not Met  -BB        Time Calculation    PT Goal Re-Cert Due Date 08/31/23  -BB               User Key  (r) = Recorded By, (t) = Taken By, (c) = Cosigned By      Initials Name Provider Type    Cecilia Ansari PT DPT Physical Therapist                                   Time Calculation:   Start Time: 1404  Stop Time: 1455  Time Calculation (min): 51 min  Timed Charges  50866 - PT Ultrasound Minutes: 8  Total Minutes  Timed Charges Total Minutes: 8   Total Minutes: 8  Therapy Charges for Today       Code Description Service Date Service Provider Modifiers Qty    11266382998 HC PT ULTRASOUND EA 15 MIN 8/16/2023 Cecilia Dallas PT DPT GP 1    18803605930 HC PT MANUAL THERAPY EA 15 MIN 8/16/2023 Cecilia Dallas PT DPT GP 2                      Cecilia Dallas PT DPT  8/16/2023     "

## 2023-08-17 NOTE — SIGNIFICANT NOTE
"   08/15/23 1150   PT Short Term Goals   STG Date to Achieve 08/31/23   STG 1 Subjective reports of feeling 80% improved   STG 2 subjective reports of <2/10 of ttp gastroc/soleus   STG 3 Complete a 6' walk test without increased pain noted   Long Term Goals   LTG Date to Achieve 08/31/23   LTG 1 added on addendum:   Pt will demo 5/5 R hip stabilization strength abduction MMT in SL   LTG 2 Pt will demo standing single R  heel rise light UE assist for balance to 6\"  from floor symmetrical to  to B heel rise 6\"  to demo improved gastroc/soleus strength   LTG 3 Pt able to complete single single R heel rise with light UE support for balance and control eccentrically without foot flop lowering   LTG 4 Pt will demo ability to perform seated and standing toe yoga on R involved foot to demo improved intrinsic control   LTG 5 Pt [place] at [place] without increased symptoms to demo initiation to PLOF   Time Calculation   PT Goal Re-Cert Due Date 08/31/23       "

## 2023-08-21 ENCOUNTER — HOSPITAL ENCOUNTER (OUTPATIENT)
Dept: PHYSICAL THERAPY | Facility: HOSPITAL | Age: 48
Setting detail: THERAPIES SERIES
Discharge: HOME | End: 2023-08-21
Payer: COMMERCIAL

## 2023-08-21 DIAGNOSIS — M72.2 PLANTAR FASCIITIS: Primary | ICD-10-CM

## 2023-08-21 DIAGNOSIS — M79.671 RIGHT FOOT PAIN: ICD-10-CM

## 2023-08-21 PROCEDURE — 97110 THERAPEUTIC EXERCISES: CPT

## 2023-08-21 PROCEDURE — 97140 MANUAL THERAPY 1/> REGIONS: CPT

## 2023-08-21 PROCEDURE — 97535 SELF CARE MNGMENT TRAINING: CPT

## 2023-08-21 NOTE — THERAPY TREATMENT NOTE
Outpatient Physical Therapy Ortho Treatment Note  [facility]     Patient Name: Estee Owens  : 1975  MRN: [id number]  Today's Date: 2023    Visit attendance:  approved  % Improved: abigail CHAVEZ appt: abigail  Recert due date: 23    Therapy diagnosis:R foot pain     Visit Date: 2023    Visit Dx:    ICD-10-CM ICD-9-CM   1. Plantar fasciitis  M72.2 728.71   2. Right foot pain  M79.671 729.5       Patient Active Problem List   Diagnosis    Cholecystitis    Gastroesophageal reflux disease without esophagitis    Pain of upper abdomen    Encounter for screening for malignant neoplasm of colon    Bacterial enteritis    Essential hypertension    Chest pressure    Dyspnea on exertion    Family history of early CAD        Past Medical History:   Diagnosis Date    Asthma     Gallstones     PONV (postoperative nausea and vomiting)     Seasonal allergies         Past Surgical History:   Procedure Laterality Date    CHOLECYSTECTOMY WITH INTRAOPERATIVE CHOLANGIOGRAM N/A 2020    Procedure: CHOLECYSTECTOMY LAPAROSCOPIC INTRAOPERATIVE CHOLANGIOGRAM      (C-ARM#2)        (ANESTHESIA REQUEST JEANNINE OR TIFFANIE, NO ANESTHESIA STUDENTS);  Surgeon: Franky Max MD;  Location: [facility] OR;  Service: General    COLONOSCOPY N/A 2022    Procedure: COLONOSCOPY 4:00;  Surgeon: Saad Lizarraga DO;  Location: [facility] ENDOSCOPY;  Service: Gastroenterology;  Laterality: N/A;    DILATATION AND CURETTAGE      ENDOSCOPY N/A 2020    Procedure: ESOPHAGOGASTRODUODENOSCOPY;  Surgeon: Saad Lizarraga DO;  Location: [facility] ENDOSCOPY;  Service: Gastroenterology;  Laterality: N/A;    ENDOSCOPY N/A 2022    Procedure: ESOPHAGOGASTRODUODENOSCOPY 4:00;  Surgeon: Saad Lizarraga DO;  Location: [facility] ENDOSCOPY;  Service: Gastroenterology;  Laterality: N/A;        PT Ortho       Row Name 23 1100       Subjective Comments    Subjective Comments Pt frustrated over continued R foot pain and  "unable to resume her regular ex routine. She is missing out on walking with her  at park nightly due to pain. She went shopping to return some items, and unable to go play putt putt afteward with increased foot pain. Professes compliance with HEP  -       Subjective Pain    Able to rate subjective pain? yes  -    Pre-Treatment Pain Level 4  -    Subjective Pain Comment walking 4/10 pain feeling \"nail in heel\" sensation  -       Posture/Observations    Posture/Observations Comments arrives with OTC orthtoics and heel lift in both shoes; not antalgic  -              User Key  (r) = Recorded By, (t) = Taken By, (c) = Cosigned By      Initials Name Provider Type     Rozina Christianson, PT Physical Therapist                                 PT Assessment/Plan       Row Name 08/21/23 1100          PT Assessment    Functional Limitations Impaired gait;Limitations in community activities;Limitations in functional capacity and performance  -     Impairments Gait;Pain;Impaired muscle length;Impaired muscle endurance  -     Assessment Comments Pt continues to demo altered myofascial and soft tissue mobility in R posterior chain, lower leg.  Demo most pain in R heel with WB, walking. Minimal TTP.  Weakness evident in standing with gastroc/soleus strengthening.  She is unable to heel rise as high on R as on  L uninvolved.   Ed pt in contacting NP, Peter Pinedo, to inquire of CAM boot or Fixed R ankle walker to wear when up WB to help reduce pain from walking and \"rest\" Achilles while working in PT and with recovery/increasing tissue tolerance and strength over 2-3 wk period.  She will benefit from continued PT addressing manual and progressive strength/tissue tolerance  -        PT Plan    PT Frequency 2x/week  -     Predicted Duration of Therapy Intervention (PT) 3-4  -     PT Plan Comments address unmet PT goals; inquire if MD recommends CAM boot, FAW on R foot; modalities and manual prn;  address " "progression of posteiror chain lower leg tissue tolerance, strength progression ankle, foot, hip stabilization  -               User Key  (r) = Recorded By, (t) = Taken By, (c) = Cosigned By      Initials Name Provider Type     Rozina Christianson, PT Physical Therapist                       OP Exercises       Row Name 08/21/23 1100             Subjective Comments    Subjective Comments Pt frustrated over continued R foot pain and unable to resume her regular ex routine. She is missing out on walking with her  at park nightly due to pain. She went shopping to return some items, and unable to go play [employer] afteward with increased foot pain. Professes compliance with HEP  -         Subjective Pain    Able to rate subjective pain? yes  -      Pre-Treatment Pain Level 4  -      Subjective Pain Comment walking 4/10 pain feeling \"nail in heel\" sensation  -         Exercise 1    Exercise Name 1 recumbent  increasing blood flow LE  -      Time 1 5 minutes  -      Additional Comments lvl 4  -         Exercise 2    Exercise Name 2 Pt ed  -         Exercise 3    Exercise Name 3 see manual  -         Exercise 4    Exercise Name 4 standing slant board gastroc and soleus stretch B UE support each  -      Reps 4 2  -      Time 4 1 minute eac  -         Exercise 5    Exercise Name 5 standing  UE support slight CR into supination  -      Reps 5 20  -         Exercise 6    Exercise Name 6 standing B Calf raise, wt shift R, lower eccentric  -      Reps 6 20  -         Exercise 7    Exercise Name 7 standing bent knee soleus isolated calf raise B  -      Reps 7 20  -         Exercise 8    Exercise Name 8 standing BAPS shoes on R pf, df;  B UE support  -      Reps 8 20  -      Additional Comments lvl 2 board  -         Exercise 9    Exercise Name 9 assessment SL knee plank top leg abd for hip stabilization HEP as previously prescribed  -      Additional Comments pt required PT visual " "cues for proper form  -                User Key  (r) = Recorded By, (t) = Taken By, (c) = Cosigned By      Initials Name Provider Type     Rozina Christianson, PT Physical Therapist                             Manual Rx (last 36 hours)       Manual Treatments       Row Name 08/21/23 1100             Manual Rx 1    Manual Rx 1 Location MFR to PF and plantar fascia instrument assisted; pt prone with PT providing gastroc stretch/soleus bent knee stretch during passive pump  -      Manual Rx 1 Duration 6 minutes  -         Manual Rx 2    Manual Rx 2 Location subtalar R joint mobilization  -      Manual Rx 2 Grade III  -      Manual Rx 2 Duration 2 minutes  -                User Key  (r) = Recorded By, (t) = Taken By, (c) = Cosigned By      Initials Name Provider Type     Rozina Christianson, PT Physical Therapist                     PT OP Goals       Row Name 08/21/23 1100          PT Short Term Goals    STG Date to Achieve 08/31/23  -     STG 1 Subjective reports of feeling 80% improved  -     STG 2 subjective reports of <2/10 of ttp gastroc/soleus  -     STG 3 Complete a 6' walk test without increased pain noted  -        Long Term Goals    LTG Date to Achieve 08/31/23  -     LTG 1 added on addendum:   Pt will demo 5/5 R hip stabilization strength abduction MMT in SL  -     LTG 2 Pt will demo standing single R  heel rise light UE assist for balance to 6\"  from floor symmetrical to  to B heel rise 6\"  to demo improved gastroc/soleus strength  -     LTG 3 Pt able to complete single single R heel rise with light UE support for balance and control eccentrically without foot flop lowering  -     LTG 4 Pt will demo ability to perform seated and standing toe yoga on R involved foot to demo improved intrinsic control  -     LTG 5 Pt [place] at least 30 minutes without increased symptoms to demo initiation to PLOF  -        Time Calculation    PT Goal Re-Cert Due Date " "08/31/23  -               User Key  (r) = Recorded By, (t) = Taken By, (c) = Cosigned By      Initials Name Provider Type    Rozina Diallo, PT Physical Therapist                    Therapy Education  Education Details: ed pt to continue HEP as prescribed. She is getting depressed.  Ed pt in getting some other type of ex/activity in place of the nightly hour walking like biking, pool ex.  She will try to bike.  Ed pt in contacting NP, Peter Pinedo, to inquire of CAM boot or Fixed R ankle walker to wear when up WB to help reduce pain from walking and \"rest\" Achilles while working in PT and with recovery/increasing tissue tolerance and strength over 2-3 wk period.  Given: HEP, Symptoms/condition management  Program: Reinforced, New  How Provided: Verbal  Provided to: Patient  Level of Understanding: Verbalized              Time Calculation:   Start Time: 1100  Stop Time: 1145  Time Calculation (min): 45 min  Therapy Charges for Today       Code Description Service Date Service Provider Modifiers Qty    01645515547 HC PT MANUAL THERAPY EA 15 MIN 8/21/2023 Rozina Christianson, PT GP 1    [id number]  PT THER PROC EA 15 MIN 8/21/2023 Rozina Christianson, PT GP 2    [id number]  PT SELF CARE/MGMT/TRAIN EA 15 MIN 8/21/2023 Rozina Christianson, PT GP 1                      Rozina Christianson PT  8/21/2023     "

## 2023-08-24 ENCOUNTER — HOSPITAL ENCOUNTER (OUTPATIENT)
Dept: PHYSICAL THERAPY | Facility: HOSPITAL | Age: 48
Setting detail: THERAPIES SERIES
Discharge: HOME | End: 2023-08-24
Payer: COMMERCIAL

## 2023-08-24 DIAGNOSIS — M72.2 PLANTAR FASCIITIS: Primary | ICD-10-CM

## 2023-08-24 DIAGNOSIS — M79.671 RIGHT FOOT PAIN: ICD-10-CM

## 2023-08-24 PROCEDURE — 97140 MANUAL THERAPY 1/> REGIONS: CPT

## 2023-08-24 PROCEDURE — 97110 THERAPEUTIC EXERCISES: CPT

## 2023-08-24 PROCEDURE — 97535 SELF CARE MNGMENT TRAINING: CPT

## 2023-08-24 NOTE — THERAPY TREATMENT NOTE
Outpatient Physical Therapy Ortho Treatment Note  [facility]     Patient Name: Estee Owens  : 1975  MRN: [id number]  Today's Date: 2023    Visit attendance:  approved  % Improved: tbd; very slight at this time  MD appt: abigail  Recert due date: 23    Therapy diagnosis: R foot pain; heel spur    Visit Date: 2023    Visit Dx:    ICD-10-CM ICD-9-CM   1. Plantar fasciitis  M72.2 728.71   2. Right foot pain  M79.671 729.5       Patient Active Problem List   Diagnosis    Cholecystitis    Gastroesophageal reflux disease without esophagitis    Pain of upper abdomen    Encounter for screening for malignant neoplasm of colon    Bacterial enteritis    Essential hypertension    Chest pressure    Dyspnea on exertion    Family history of early CAD        Past Medical History:   Diagnosis Date    Asthma     Gallstones     PONV (postoperative nausea and vomiting)     Seasonal allergies         Past Surgical History:   Procedure Laterality Date    CHOLECYSTECTOMY WITH INTRAOPERATIVE CHOLANGIOGRAM N/A 2020    Procedure: CHOLECYSTECTOMY LAPAROSCOPIC INTRAOPERATIVE CHOLANGIOGRAM      (C-ARM#2)        (ANESTHESIA REQUEST JEANNINE OR TIFFANIE, NO ANESTHESIA STUDENTS);  Surgeon: Franky Max MD;  Location: [facility] OR;  Service: General    COLONOSCOPY N/A 2022    Procedure: COLONOSCOPY 4:00;  Surgeon: Saad Lizarraga DO;  Location: [facility] ENDOSCOPY;  Service: Gastroenterology;  Laterality: N/A;    DILATATION AND CURETTAGE      ENDOSCOPY N/A 2020    Procedure: ESOPHAGOGASTRODUODENOSCOPY;  Surgeon: Saad Lizarraga DO;  Location: [facility] ENDOSCOPY;  Service: Gastroenterology;  Laterality: N/A;    ENDOSCOPY N/A 2022    Procedure: ESOPHAGOGASTRODUODENOSCOPY 4:00;  Surgeon: Saad Lizarraga DO;  Location: [facility] ENDOSCOPY;  Service: Gastroenterology;  Laterality: N/A;        PT Ortho       Row Name 23 1020       Subjective Comments    Subjective Comments Pt  obtained R foot fixed ankle walker from LAZARA Pinedo's office. She trialed this in her home yesterday for about an hour, but it only made her R heel hurt worse. She is going to try to return it.  She feels heel pain maybe a slight bit better  -       Subjective Pain    Able to rate subjective pain? yes  -    Pre-Treatment Pain Level 3  -    Post-Treatment Pain Level 3  -    Subjective Pain Comment only when walking  -       Posture/Observations    Posture/Observations Comments arrives with OTC orthtoics and heel lift in both shoes; not antalgic; brought in R fixed ankle walker  -              User Key  (r) = Recorded By, (t) = Taken By, (c) = Cosigned By      Initials Name Provider Type     Rozina Christianson, PT Physical Therapist                                 PT Assessment/Plan       Row Name 08/24/23 1020          PT Assessment    Functional Limitations Impaired gait;Limitations in community activities;Limitations in functional capacity and performance  -     Impairments Gait;Pain;Impaired muscle length;Impaired muscle endurance  -     Assessment Comments Pt demo pain in R heel, not so much Achilles.  She trialed fixed ankle walker to reduce stress to achilles, but could not tolerate with pain in heel due to poor padding. She voices that she is able to walk around her house with [place] slipper type shoes that have lots of padding. Therefore, recommend she trial gummy heel cups to pad heel spur. Pain in R heel no so much over plantar fascia insertion, but mid calcaneous where bone spur present with crepitus.  Pt able to perform ex in standing today no shoes on padding of airex no pain.  -        PT Plan    PT Frequency 2x/week  -     Predicted Duration of Therapy Intervention (PT) 3-4 wks  -     PT Plan Comments address unmet goals; inqurie if pt obtainined heel cup gummy inserts and pain management with these  -               User Key  (r) = Recorded By, (t) = Taken By, (c) = Cosigned By       Initials Name Provider Type     Rozina Christianson, PT Physical Therapist                       OP Exercises       Row Name 08/24/23 1020             Subjective Comments    Subjective Comments Pt obtained R foot fixed ankle walker from NP RAYMOND Pinedo's office. She trialed this in her home yesterday for about an hour, but it only made her R heel hurt worse. She is going to try to return it.  She feels heel pain maybe a slight bit better  -         Subjective Pain    Able to rate subjective pain? yes  -      Pre-Treatment Pain Level 3  -      Post-Treatment Pain Level 3  -      Subjective Pain Comment only when walking  -         Exercise 1    Exercise Name 1 PT assessment of R fixed ankle walker.  She is able to demo independence in don/doff.  The boot itself has no padding over heel, which creates pain in WB. She does not have achilles pain, but pain on R heel; crepitus to touch, manual.  PT recommends purchase of soft gummy heel cups to pad R heel spur;   Call or My chart MAITE Pinedo office to return fixed ankle walker as she is unable to tolerate even with use of her orthotics in boot today  -      Additional Comments 6 minute walk test using R fixed ankle walk and her orthotics pain 3/10  -         Exercise 2    Exercise Name 2 see manual  -         Exercise 3    Exercise Name 3 long 6' airex tip toe lateral walks R/L UE support no shoes  -      Reps 3 x 40 total steps  -         Exercise 4    Exercise Name 4 B Heel rise thru ROM on airex  UE support no shoes  -      Reps 4 20  -      Additional Comments wt shift to R eccentric  -         Exercise 5    Exercise Name 5 SLS on R airex no shoes with L hip abd  -      Reps 5 20  -         Exercise 6    Exercise Name 6 SLS on R airex no shoes MARTHA pulls RTB R, L  -      Reps 6 20 each  -         Exercise 7    Exercise Name 7 slant stretch gastroc and soleus  -      Reps 7 30 sec x 2  -         Exercise 8    Exercise Name 8 pt ed   "-                User Key  (r) = Recorded By, (t) = Taken By, (c) = Cosigned By      Initials Name Provider Type     Rozina Christianson, PT Physical Therapist                             Manual Rx (last 36 hours)       Manual Treatments       Row Name 08/24/23 1020             Manual Rx 1    Manual Rx 1 Location pt prone STM heel, achilles, gastroc; emphasis over heel/crepitus  -      Manual Rx 1 Duration 6 minutes  -         Manual Rx 2    Manual Rx 2 Location subtalar R joint mobilization, and anterior talocrural glides  -      Manual Rx 2 Grade III  -      Manual Rx 2 Duration 2 minutes  -                User Key  (r) = Recorded By, (t) = Taken By, (c) = Cosigned By      Initials Name Provider Type     Rozina Christianson, PT Physical Therapist                     PT OP Goals       Row Name 08/24/23 1020          PT Short Term Goals    STG Date to Achieve 08/31/23  -     STG 1 Subjective reports of feeling 80% improved  -     STG 2 subjective reports of <2/10 of ttp gastroc/soleus  -     STG 3 Complete a 6' walk test without increased pain noted  -        Long Term Goals    LTG Date to Achieve 08/31/23  -     LTG 1 added on addendum:   Pt will demo 5/5 R hip stabilization strength abduction MMT in SL  -     LTG 2 Pt will demo standing single R  heel rise light UE assist for balance to 6\"  from floor symmetrical to  to B heel rise 6\"  to demo improved gastroc/soleus strength  -     LTG 3 Pt able to complete single single R heel rise with light UE support for balance and control eccentrically without foot flop lowering  -     LTG 4 Pt will demo ability to perform seated and standing toe yoga on R involved foot to demo improved intrinsic control  -     LTG 5 Pt [place] at [place] 30 minutes without increased symptoms to demo initiation to PLOF  -        Time Calculation    PT Goal Re-Cert Due Date 08/31/23  -               User Key  (r) = Recorded By, (t) = " Taken By, (c) = Cosigned By      Initials Name Provider Type    Rozina Diallo, PT Physical Therapist                    Therapy Education  Education Details: Ed pt to return fixed ankle walker to MD if able since she is unable to tolerate pain in heel even with use of her orthotics;  PT ed pt in purchase of heel cup gummies to pad R heel/bone spur from  [employer]. She is able to return these if they don't reduce heel pain.  She can wear with or without her orthotics whichever feels better.  Continue HEP as presviously prescribed.  Roll bottom of R heel on frozen water bottle prn 5-8 minutes to reduce pain  Given: HEP, Symptoms/condition management  Program: Reinforced, New  How Provided: Verbal  Provided to: Patient  Level of Understanding: Verbalized              Time Calculation:   Start Time: 1020  Stop Time: 1100  Time Calculation (min): 40 min  Therapy Charges for Today       Code Description Service Date Service Provider Modifiers Qty    [id number]  PT MANUAL THERAPY EA 15 MIN 8/24/2023 Rozina Christianson, PT GP 1    [id number] HC PT THER PROC EA 15 MIN 8/24/2023 Rozina Christianson PT GP 1    [id number]  PT SELF CARE/MGMT/TRAIN EA 15 MIN 8/24/2023 Rozina Christianson, PT GP 1                      Rozina Christianson PT  8/24/2023

## 2023-08-28 ENCOUNTER — HOSPITAL ENCOUNTER (OUTPATIENT)
Dept: PHYSICAL THERAPY | Facility: HOSPITAL | Age: 48
Setting detail: THERAPIES SERIES
Discharge: HOME | End: 2023-08-28
Payer: COMMERCIAL

## 2023-08-28 ENCOUNTER — APPOINTMENT (OUTPATIENT)
Dept: PHYSICAL THERAPY | Facility: HOSPITAL | Age: 48
End: 2023-08-28
Payer: COMMERCIAL

## 2023-08-28 DIAGNOSIS — M79.671 RIGHT FOOT PAIN: ICD-10-CM

## 2023-08-28 DIAGNOSIS — M72.2 PLANTAR FASCIITIS: Primary | ICD-10-CM

## 2023-08-28 PROCEDURE — 97110 THERAPEUTIC EXERCISES: CPT

## 2023-08-28 PROCEDURE — 97140 MANUAL THERAPY 1/> REGIONS: CPT

## 2023-08-28 PROCEDURE — 97035 APP MDLTY 1+ULTRASOUND EA 15: CPT

## 2023-08-28 NOTE — THERAPY TREATMENT NOTE
Outpatient Physical Therapy Ortho Treatment Note  [facility]     Patient Name: Estee Owens  : 1975  MRN: [id number]  Today's Date: 2023      Visit Date: 2023    Subjective Improvement 0  Visits 6/6  Visits approved 90 combined  RTMD 10-  Recert Date 2023    R foot pain, heel spur      Visit Dx:    ICD-10-CM ICD-9-CM   1. Plantar fasciitis  M72.2 728.71   2. Right foot pain  M79.671 729.5       Patient Active Problem List   Diagnosis    Cholecystitis    Gastroesophageal reflux disease without esophagitis    Pain of upper abdomen    Encounter for screening for malignant neoplasm of colon    Bacterial enteritis    Essential hypertension    Chest pressure    Dyspnea on exertion    Family history of early CAD        Past Medical History:   Diagnosis Date    Asthma     Gallstones     PONV (postoperative nausea and vomiting)     Seasonal allergies         Past Surgical History:   Procedure Laterality Date    CHOLECYSTECTOMY WITH INTRAOPERATIVE CHOLANGIOGRAM N/A 2020    Procedure: CHOLECYSTECTOMY LAPAROSCOPIC INTRAOPERATIVE CHOLANGIOGRAM      (C-ARM#2)        (ANESTHESIA REQUEST JEANNINE OR TIFFANIE, NO ANESTHESIA STUDENTS);  Surgeon: Franky Max MD;  Location: [facility] OR;  Service: General    COLONOSCOPY N/A 2022    Procedure: COLONOSCOPY 4:00;  Surgeon: Saad Lizarraga DO;  Location: [facility] ENDOSCOPY;  Service: Gastroenterology;  Laterality: N/A;    DILATATION AND CURETTAGE      ENDOSCOPY N/A 2020    Procedure: ESOPHAGOGASTRODUODENOSCOPY;  Surgeon: Saad Lizarraga DO;  Location: [facility] ENDOSCOPY;  Service: Gastroenterology;  Laterality: N/A;    ENDOSCOPY N/A 2022    Procedure: ESOPHAGOGASTRODUODENOSCOPY 4:00;  Surgeon: Saad Lizarraga DO;  Location: [facility] ENDOSCOPY;  Service: Gastroenterology;  Laterality: N/A;        PT Ortho       Row Name 23 1600       Subjective Pain    Able to rate subjective pain? yes  -CP    Pre-Treatment  Pain Level 4  -CP    Subjective Pain Comment walking and standing  -CP              User Key  (r) = Recorded By, (t) = Taken By, (c) = Cosigned By      Initials Name Provider Type    Sary Mcfadden PTA Physical Therapist Assistant                                 PT Assessment/Plan       Row Name 08/28/23 1620          PT Assessment    Assessment Comments Good tolerance to trial of US.  She is TTP to left heel. and gastroc area.  A trial of heellift placed under power steps.  Patient was advised the if the heel lift seems to increase her pain to take them out and to cont to wear the powersteps.  -CP        PT Plan    PT Frequency 2x/week  -CP     Predicted Duration of Therapy Intervention (PT) 3-4 weeks  -CP     PT Plan Comments Cont with POC.  recheck next visit  -CP               User Key  (r) = Recorded By, (t) = Taken By, (c) = Cosigned By      Initials Name Provider Type    Sary Mcfadden PTA Physical Therapist Assistant                     Modalities       Row Name 08/28/23 1600             Ultrasound 13453    Location PF and heel area  -CP      Duty Cycle 100  -CP      Frequency 1.0 MHz  -CP      Intensity - Wts/cm 1.5  -CP      59100 - PT Ultrasound Minutes 8  -CP                User Key  (r) = Recorded By, (t) = Taken By, (c) = Cosigned By      Initials Name Provider Type    Sary Mcfadden PTA Physical Therapist Assistant                   OP Exercises       Row Name 08/28/23 1636 08/28/23 1600          Subjective Comments    Subjective Comments -- States that her foot was feeling a little bit better.  today pain has increase.  She really hasnt done that much and is doing HEP, wear power steps She did purchase the heel cup as recommended but they where too high  -CP        Subjective Pain    Able to rate subjective pain? -- yes  -CP     Pre-Treatment Pain Level -- 4  -CP     Subjective Pain Comment -- walking and standing  -CP        Total Minutes    23877 - PT Therapeutic Exercise  Minutes 20  -CP --     16328 - PT Manual Therapy Minutes 12  -CP --        Exercise 1    Exercise Name 1 -- US see modalitles  -CP        Exercise 2    Exercise Name 2 -- see manual  -CP        Exercise 3    Exercise Name 3 -- soleus stretch  -CP     Cueing 3 -- Verbal;Tactile  -CP     Sets 3 -- 3  -CP     Time 3 -- 330' holds  -CP        Exercise 4    Exercise Name 4 -- toe heriberto sitting and standing  -CP     Cueing 4 -- Verbal;Tactile  -CP     Sets 4 -- 1  -CP     Reps 4 -- 10 each  -CP        Exercise 5    Exercise Name 5 -- review HEP  -CP        Exercise 6    Exercise Name 6 -- trial of heel lift in b Shoes  -CP               User Key  (r) = Recorded By, (t) = Taken By, (c) = Cosigned By      Initials Name Provider Type    Sary Mcfadden, SRINATH Physical Therapist Assistant                             Manual Rx (last 36 hours)       Manual Treatments       Row Name 08/28/23 1636 08/28/23 1500          Total Minutes    58482 - PT Manual Therapy Minutes 12  -CP --        Manual Rx 1    Manual Rx 1 Location -- pt prone STM heel, achilles, gastroc; emphasis over heel/crepitus  -CP     Manual Rx 1 Duration -- 6 minutes  -CP        Manual Rx 2    Manual Rx 2 Location -- subtalar R joint mobilization, and anterior talocrural glides  -CP     Manual Rx 2 Grade -- III  -CP     Manual Rx 2 Duration -- 2 minutes  -CP        Manual Rx 3    Manual Rx 3 Location -- gastroc  -CP     Manual Rx 3 Type -- MFR/TrP  -CP     Manual Rx 3 Duration -- 6  -CP               User Key  (r) = Recorded By, (t) = Taken By, (c) = Cosigned By      Initials Name Provider Type    Sary Mcfadden PTA Physical Therapist Assistant                     PT OP Goals       Row Name 08/28/23 1600          PT Short Term Goals    STG Date to Achieve 08/31/23  -CP     STG 1 Subjective reports of feeling 80% improved  -CP     STG 2 subjective reports of <2/10 of ttp gastroc/soleus  -CP     STG 3 Complete a 6' walk test without increased pain noted   "-CP        Long Term Goals    LTG Date to Achieve 08/31/23  -CP     LTG 1 added on addendum:   Pt will demo 5/5 R hip stabilization strength abduction MMT in SL  -CP     LTG 2 Pt will demo standing single R  heel rise light UE assist for balance to 6\"  from floor symmetrical to  to B heel rise 6\"  to demo improved gastroc/soleus strength  -CP     LTG 3 Pt able to complete single single R heel rise with light UE support for balance and control eccentrically without foot flop lowering  -CP     LTG 4 Pt will demo ability to perform seated and standing toe yoga on R involved foot to demo improved intrinsic control  -CP     LTG 5 Pt able [place] at least 30 minutes without increased symptoms to demo initiation to PLOF  -CP        Time Calculation    PT Goal Re-Cert Due Date 08/31/23  -CP               User Key  (r) = Recorded By, (t) = Taken By, (c) = Cosigned By      Initials Name Provider Type    CP Sary Zazueta, SRINATH Physical Therapist Assistant                    Therapy Education  Education Details: soleus stretch  Given: HEP  Program: New  How Provided: Verbal, Demonstration  Provided to: Patient  Level of Understanding: Teach back education performed, Verbalized, Demonstrated              Time Calculation:   Start Time: 0315  Stop Time: 0355  Time Calculation (min): 40 min  Total Timed Code Minutes- PT: 40 minute(s)  Timed Charges  85357 - PT Ultrasound Minutes: 8  73649 - PT Therapeutic Exercise Minutes: 20  49464 - PT Manual Therapy Minutes: 12  Total Minutes  Timed Charges Total Minutes: 40   Total Minutes: 40  Therapy Charges for Today       Code Description Service Date Service Provider Modifiers Qty    60985966079 HC PT THER PROC EA 15 MIN 8/28/2023 Sary Zazueta, PTA GP 1    59506307363 HC PT MANUAL THERAPY EA 15 MIN 8/28/2023 Sary Zazueta, PTA GP 1    32140781313 HC PT ULTRASOUND EA 15 MIN 8/28/2023 Sary Zazueta, PTA GP 1                      Sary Zazueta, " PTA  8/28/2023

## 2023-08-31 ENCOUNTER — HOSPITAL ENCOUNTER (OUTPATIENT)
Dept: PHYSICAL THERAPY | Facility: HOSPITAL | Age: 48
Setting detail: THERAPIES SERIES
Discharge: HOME | End: 2023-08-31
Payer: COMMERCIAL

## 2023-08-31 DIAGNOSIS — M79.671 RIGHT FOOT PAIN: ICD-10-CM

## 2023-08-31 DIAGNOSIS — M72.2 PLANTAR FASCIITIS: Primary | ICD-10-CM

## 2023-08-31 PROCEDURE — 97140 MANUAL THERAPY 1/> REGIONS: CPT | Performed by: PHYSICAL THERAPIST

## 2023-08-31 NOTE — THERAPY PROGRESS REPORT/RE-CERT
Outpatient Physical Therapy Ortho Progress Note  [facility]     Patient Name: Estee Owens  : 1975  MRN: [id number]  Today's Date: 2023      Visit Date: 2023    Visit Dx:    ICD-10-CM ICD-9-CM   1. Plantar fasciitis  M72.2 728.71   2. Right foot pain  M79.671 729.5       Patient Active Problem List   Diagnosis    Cholecystitis    Gastroesophageal reflux disease without esophagitis    Pain of upper abdomen    Encounter for screening for malignant neoplasm of colon    Bacterial enteritis    Essential hypertension    Chest pressure    Dyspnea on exertion    Family history of early CAD        Past Medical History:   Diagnosis Date    Asthma     Gallstones     PONV (postoperative nausea and vomiting)     Seasonal allergies         Past Surgical History:   Procedure Laterality Date    CHOLECYSTECTOMY WITH INTRAOPERATIVE CHOLANGIOGRAM N/A 2020    Procedure: CHOLECYSTECTOMY LAPAROSCOPIC INTRAOPERATIVE CHOLANGIOGRAM      (C-ARM#2)        (ANESTHESIA REQUEST JEANNINE OR TIFAFNIE, NO ANESTHESIA STUDENTS);  Surgeon: Franky Max MD;  Location: [facility] OR;  Service: General    COLONOSCOPY N/A 2022    Procedure: COLONOSCOPY 4:00;  Surgeon: Saad Lizarraga DO;  Location: [facility] ENDOSCOPY;  Service: Gastroenterology;  Laterality: N/A;    DILATATION AND CURETTAGE      ENDOSCOPY N/A 2020    Procedure: ESOPHAGOGASTRODUODENOSCOPY;  Surgeon: Saad Lizarraga DO;  Location: [facility] ENDOSCOPY;  Service: Gastroenterology;  Laterality: N/A;    ENDOSCOPY N/A 2022    Procedure: ESOPHAGOGASTRODUODENOSCOPY 4:00;  Surgeon: Saad Lizarraga DO;  Location: [facility] ENDOSCOPY;  Service: Gastroenterology;  Laterality: N/A;        PT Ortho       Row Name 23 1522       Subjective Pain    Able to rate subjective pain? yes  -BB    Pre-Treatment Pain Level 1  -BB    Post-Treatment Pain Level --  more sensitive  -BB       Posture/Observations    Posture/Observations Comments ttp of  medial calcaneus at site of PF insert. Right calcaneal inversion: 15 degrees, left 5 degrees  -BB       General ROM    GENERAL ROM COMMENTS ankle AROM is WNL no pain  -BB       MMT (Manual Muscle Testing)    General MMT Comments knees and ankles are 5/5 in sitting  -BB       Sensation    Sensation WNL? WNL  -BB              User Key  (r) = Recorded By, (t) = Taken By, (c) = Cosigned By      Initials Name Provider Type    Cecilia Ansari PT DPT Physical Therapist                                 PT Assessment/Plan       Row Name 08/31/23 1522          PT Assessment    Functional Limitations Impaired gait;Limitations in community activities;Limitations in functional capacity and performance  -BB     Impairments Gait;Pain;Impaired muscle length;Impaired muscle endurance  -BB     Assessment Comments Patient presents today with pain pinpoint at site of heel spur. PF ttp and crepitus has improved, gastroc ROM is WNL but noted to have taut bands. Calcaneus resting inverted compared to left. Could benefit from custom orthotics for offloading of heel spur and biomechanical alignment improvement.  -BB     Rehab Potential Good  -BB     Patient/caregiver participated in establishment of treatment plan and goals Yes  -BB     Patient would benefit from skilled therapy intervention Yes  -BB        PT Plan    PT Frequency 2x/week  -BB     Predicted Duration of Therapy Intervention (PT) 3-4 weeks  -BB     PT Plan Comments progress manual, stretching, foot/ankle strength  -BB               User Key  (r) = Recorded By, (t) = Taken By, (c) = Cosigned By      Initials Name Provider Type    Cecilia Ansari, PT DPT Physical Therapist                       OP Exercises       Row Name 08/31/23 6063             Subjective Comments    Subjective Comments Present with reports feeling pain intermittent. Pain is mostly in heel. PF feels less crunchy. Feels 50% improved.  -BB         Subjective Pain    Able to rate subjective pain? yes  -BB    "   Pre-Treatment Pain Level 1  -BB      Post-Treatment Pain Level --  more sensitive  -BB         Exercise 1    Exercise Name 1 recheck/poc  -BB         Exercise 2    Exercise Name 2 calcaneal mobes  -BB      Time 2 30  -BB         Exercise 3    Exercise Name 3 calcaneal distraction with PF  -BB      Time 3 5'  -BB         Exercise 4    Exercise Name 4 trp to distal achilles tendon  -BB      Time 4 5'  -BB         Exercise 5    Exercise Name 5 cross friction and rolling of gastroc complex  -BB      Time 5 3'  -BB         Exercise 6    Exercise Name 6 orthotic scan for shocker with deep heel cup, heel spur cut out and padded undercover  -BB                User Key  (r) = Recorded By, (t) = Taken By, (c) = Cosigned By      Initials Name Provider Type    Cecilia Ansari, PT DPT Physical Therapist                                  PT OP Goals       Row Name 08/31/23 1522          PT Short Term Goals    STG Date to Achieve 08/31/23  -BB     STG 1 Subjective reports of feeling 80% improved  -BB     STG 1 Progress Not Met  -BB     STG 2 subjective reports of <2/10 of ttp gastroc/soleus  -BB     STG 2 Progress Not Met  -BB     STG 2 Progress Comments 7/10reported  -BB     STG 3 Complete a 6' walk test without increased pain noted  -BB     STG 3 Progress Not Met  not noted  -BB        Long Term Goals    LTG Date to Achieve 08/31/23  -BB     LTG 1 added on addendum:   Pt will demo 5/5 R hip stabilization strength abduction MMT in SL  -BB     LTG 2 Pt will demo standing single R  heel rise light UE assist for balance to 6\"  from floor symmetrical to  to B heel rise 6\"  to demo improved gastroc/soleus strength  -BB     LTG 3 Pt able to complete single single R heel rise with light UE support for balance and control eccentrically without foot flop lowering  -BB     LTG 4 Pt will demo ability to perform seated and standing toe yoga on R involved foot to demo improved intrinsic control  -BB     LTG 5 Pt able to resume walking at " [place] at least 30 minutes without increased symptoms to demo initiation to PLOF  -BB        Time Calculation    PT Goal Re-Cert Due Date 09/21/23  -BB               User Key  (r) = Recorded By, (t) = Taken By, (c) = Cosigned By      Initials Name Provider Type    Cecilia Ansari, PT DPT Physical Therapist                                   Time Calculation:   Start Time: 1522  Stop Time: 1608  Time Calculation (min): 46 min  Therapy Charges for Today       Code Description Service Date Service Provider Modifiers Qty    77729018993 HC PT-CUSTOM ORTHOTICS-LEVEL 1 8/31/2023 Cecilia Dallas, PT DPT  1    52328458420 HC PT MANUAL THERAPY EA 15 MIN 8/31/2023 Cecilia Dallas, PT DPT GP 1                      Cecilia Dallas PT DPT  8/31/2023

## 2023-09-05 ENCOUNTER — HOSPITAL ENCOUNTER (OUTPATIENT)
Dept: PHYSICAL THERAPY | Facility: HOSPITAL | Age: 48
Setting detail: THERAPIES SERIES
Discharge: HOME | End: 2023-09-05
Payer: COMMERCIAL

## 2023-09-05 DIAGNOSIS — M72.2 PLANTAR FASCIITIS: Primary | ICD-10-CM

## 2023-09-05 DIAGNOSIS — M79.671 RIGHT FOOT PAIN: ICD-10-CM

## 2023-09-05 PROCEDURE — 97140 MANUAL THERAPY 1/> REGIONS: CPT

## 2023-09-05 PROCEDURE — 97110 THERAPEUTIC EXERCISES: CPT

## 2023-09-05 NOTE — THERAPY TREATMENT NOTE
Outpatient Physical Therapy Ortho Treatment Note  [facility]     Patient Name: Estee Owens  : 1975  MRN: [id number]  Today's Date: 2023    Visit attendance:  approved  % Improved: minimal  MD appt: 10-16-23  Recert due date: 23    Therapy diagnosis: R foot/heel pain    Visit Date: 2023    Visit Dx:    ICD-10-CM ICD-9-CM   1. Plantar fasciitis  M72.2 728.71   2. Right foot pain  M79.671 729.5       Patient Active Problem List   Diagnosis    Cholecystitis    Gastroesophageal reflux disease without esophagitis    Pain of upper abdomen    Encounter for screening for malignant neoplasm of colon    Bacterial enteritis    Essential hypertension    Chest pressure    Dyspnea on exertion    Family history of early CAD        Past Medical History:   Diagnosis Date    Asthma     Gallstones     PONV (postoperative nausea and vomiting)     Seasonal allergies         Past Surgical History:   Procedure Laterality Date    CHOLECYSTECTOMY WITH INTRAOPERATIVE CHOLANGIOGRAM N/A 2020    Procedure: CHOLECYSTECTOMY LAPAROSCOPIC INTRAOPERATIVE CHOLANGIOGRAM      (C-ARM#2)        (ANESTHESIA REQUEST JEANNINE OR TIFFANIE, NO ANESTHESIA STUDENTS);  Surgeon: Franky Max MD;  Location: [facility] OR;  Service: General    COLONOSCOPY N/A 2022    Procedure: COLONOSCOPY 4:00;  Surgeon: Saad Lizarraga DO;  Location: [facility] ENDOSCOPY;  Service: Gastroenterology;  Laterality: N/A;    DILATATION AND CURETTAGE      ENDOSCOPY N/A 2020    Procedure: ESOPHAGOGASTRODUODENOSCOPY;  Surgeon: Saad Lizarraga DO;  Location: [facility] ENDOSCOPY;  Service: Gastroenterology;  Laterality: N/A;    ENDOSCOPY N/A 2022    Procedure: ESOPHAGOGASTRODUODENOSCOPY 4:00;  Surgeon: Saad Lizarraga DO;  Location: [facility] ENDOSCOPY;  Service: Gastroenterology;  Laterality: N/A;        PT Ortho       Row Name 23 1600       Subjective Comments    Subjective Comments Pain continues in R heel. Feels  "\"grissle\"   She reports has been able to walk about 2 miles once since beginning PT about 1 wk ago.  She stood helping  with pics in gym today pain increased 4/10.  Rolled with ice and decreased 2/10.  She tried [employer] heel cups and returned made her feel worse.  Cecilia PT fabricated donut pad for R heel which seems to help. She intermittently sees improvement in R heel. Able to walk a couple hours yesterday. Standing is worse than moving. Scanned for orthotics last wk;  takes 3 wks to come in  -       Subjective Pain    Able to rate subjective pain? yes  -    Pre-Treatment Pain Level 0  -    Post-Treatment Pain Level 4  -    Subjective Pain Comment pain 4/10 WB on R foot  -       Posture/Observations    Posture/Observations Comments \"static\" wtih palpation R central heel/ increased tissue thickness around central R heel  -              User Key  (r) = Recorded By, (t) = Taken By, (c) = Cosigned By      Initials Name Provider Type     Rozina Christianson, PT Physical Therapist                                 PT Assessment/Plan       Row Name 09/05/23 1600          PT Assessment    Functional Limitations Impaired gait;Limitations in community activities;Limitations in functional capacity and performance  -     Impairments Gait;Pain;Impaired muscle length;Impaired muscle endurance  -     Assessment Comments Patient presents with pain pinpoint at site of heel spur. PF ttp and crepitus has improved, gastroc ROM is WNL but noted to have taut bands. Pt has failed 2 cortisone injections,  NSAIDS, heel cups, even tried fixed ankle walker (made pain worse).  Custom orthotics for offloading of heel spur and biomechanical alignment improvement ordered.  PT addressing soft tissue and joint mobilization for improving soft tissue irritability, joint mobilization/alignment, hip stabilization. She continues to have increased pain in WB only R heel/calcalneous more so than plantar fascia.  She is noting some " "improvement as pain rating intensity less and able to walk/stand more with less intense pain.  Able to progress HEP with toe yoga resistance today and hip stabilization strengthening. Continued PT recommended at least another 2-3 wks for optimal recovery.  -     Rehab Potential Good  -     Patient/caregiver participated in establishment of treatment plan and goals Yes  -     Patient would benefit from skilled therapy intervention Yes  -        PT Plan    PT Frequency 2x/week  -     Predicted Duration of Therapy Intervention (PT) 3-4 wks  -     PT Plan Comments progress manual, stretching, foot/ankle strength  -               User Key  (r) = Recorded By, (t) = Taken By, (c) = Cosigned By      Initials Name Provider Type     Rozina Christianson, PT Physical Therapist                       OP Exercises       Row Name 09/05/23 1600             Subjective Comments    Subjective Comments Pain continues in R heel. Feels \"grissle\"   She reports has been able to walk about 2 miles once since beginning PT about 1 wk ago.  She stood helping  with pics in gym today pain increased 4/10.  Rolled with ice and decreased 2/10.  She tried [employer] heel cups and returned made her feel worse.  Cecilia PT fabricated donut pad for R heel which seems to help. She intermittently sees improvement in R heel. Able to walk a couple hours yesterday. Standing is worse than moving. Scanned for orthotics last wk;  takes 3 wks to come in  -         Subjective Pain    Able to rate subjective pain? yes  -      Pre-Treatment Pain Level 0  -      Post-Treatment Pain Level 4  -      Subjective Pain Comment pain 4/10 WB on R foot  -         Exercise 1    Exercise Name 1 manual  -         Exercise 2    Exercise Name 2 RTB resisted long sit R toe yoga great toe and toes collectively over digits  2-5; resisted  flexion  -      Reps 2 30 ea  -         Exercise 3    Exercise Name 3 resisted RTB lateral walk band over distal thighs " (attempted over outer blades of feet, but band kept slipping)  on   -      Time 3 .5 mph 1 minute facing R and L  -      Additional Comments vc, demo to maintain feet neutral forward position  -         Exercise 4    Exercise Name 4 ed pt in HEP for RTB lateral walks band over out blades to R/L  -      Additional Comments HEP 20 steps R/L  -         Exercise 5    Exercise Name 5 MARTHA on airex RTB SLS on R with shoes on facing R/L  -      Reps 5 20 each direction  -         Exercise 6    Exercise Name 6 pt education  -                User Key  (r) = Recorded By, (t) = Taken By, (c) = Cosigned By      Initials Name Provider Type     Rozina Christianson, PT Physical Therapist                             Manual Rx (last 36 hours)       Manual Treatments       Row Name 09/05/23 1600             Manual Rx 1    Manual Rx 1 Location prone R heel STM PT hands and instrument  -      Manual Rx 1 Duration 5 minutes  -         Manual Rx 2    Manual Rx 2 Location prone R gastroc, soleus STM instrument  -      Manual Rx 2 Duration 3 minutes  -         Manual Rx 3    Manual Rx 3 Location prone talocrural anterior mobs and subtalar mobilization  -      Manual Rx 3 Grade III  -      Manual Rx 3 Duration 2 minutes  -                User Key  (r) = Recorded By, (t) = Taken By, (c) = Cosigned By      Initials Name Provider Type     Rozina Christianson, PT Physical Therapist                     PT OP Goals       Row Name 09/05/23 1600          PT Short Term Goals    STG Date to Achieve 09/21/23  -     STG 1 Subjective reports of feeling 80% improved  -     STG 1 Progress Not Met  -     STG 2 subjective reports of <2/10 of ttp gastroc/soleus  -     STG 2 Progress Not Met  -     STG 3 Complete a 6' walk test without increased pain noted  -     STG 3 Progress Not Met  not noted  -        Long Term Goals    LTG Date to Achieve 09/21/23  -     LTG 1 added on addendum:   Pt will demo 5/5 R hip  "stabilization strength abduction MMT in SL  -     LT 2 Pt will demo standing single R  heel rise light UE assist for balance to 6\"  from floor symmetrical to  to B heel rise 6\"  to demo improved gastroc/soleus strength  -     LT 3 Pt able to complete single single R heel rise with light UE support for balance and control eccentrically without foot flop lowering  -     LT 4 Pt will demo ability to perform seated and standing toe yoga on R involved foot to demo improved intrinsic control  -[facility] 5 Pt able to [place] at least 30 minutes without increased symptoms to demo initiation to PLOF  -        Time Calculation    PT Goal Re-Cert Due Date 09/21/23  -               User Key  (r) = Recorded By, (t) = Taken By, (c) = Cosigned By      Initials Name Provider Type    Rozina Diallo PT Physical Therapist                    Therapy Education  Education Details: continue HEP as prescribed and bottle ice massage. Demo to pt RTB resisted R foot toe yoga to add resistance to current HEP and add lateral resisted side steps hip stabilization; band over out blades of feet  Given: HEP  Program: New, Reinforced  How Provided: Verbal, Demonstration  Provided to: Patient  Level of Understanding: Verbalized, Demonstrated              Time Calculation:   Start Time: 1600  Stop Time: 1643  Time Calculation (min): 43 min  Therapy Charges for Today       Code Description Service Date Service Provider Modifiers Qty    47769813825  PT THER PROC EA 15 MIN 9/5/2023 Rozina Christianson PT GP 2    [id number]  PT MANUAL THERAPY EA 15 MIN 9/5/2023 Rozina Christianson PT GP 1                      Rozina Christianson PT  9/5/2023     "

## 2023-09-11 ENCOUNTER — HOSPITAL ENCOUNTER (OUTPATIENT)
Dept: PHYSICAL THERAPY | Facility: HOSPITAL | Age: 48
Setting detail: THERAPIES SERIES
Discharge: HOME | End: 2023-09-11
Payer: COMMERCIAL

## 2023-09-11 DIAGNOSIS — M72.2 PLANTAR FASCIITIS: Primary | ICD-10-CM

## 2023-09-11 DIAGNOSIS — M79.671 RIGHT FOOT PAIN: ICD-10-CM

## 2023-09-11 PROCEDURE — 97110 THERAPEUTIC EXERCISES: CPT

## 2023-09-11 PROCEDURE — 97140 MANUAL THERAPY 1/> REGIONS: CPT

## 2023-09-11 NOTE — THERAPY TREATMENT NOTE
"  Outpatient Physical Therapy Ortho Treatment Note  [facility]     Patient Name: Estee Owens  : 1975  MRN: [id number]  Today's Date: 2023    Visit attendance:  approved  % Improved:  \"some\"  MD appt: 10-16-23  Recert due date: 23    Therapy diagnosis:R foot/heel pain    Visit Date: 2023    Visit Dx:    ICD-10-CM ICD-9-CM   1. Plantar fasciitis  M72.2 728.71   2. Right foot pain  M79.671 729.5       Patient Active Problem List   Diagnosis    Cholecystitis    Gastroesophageal reflux disease without esophagitis    Pain of upper abdomen    Encounter for screening for malignant neoplasm of colon    Bacterial enteritis    Essential hypertension    Chest pressure    Dyspnea on exertion    Family history of early CAD        Past Medical History:   Diagnosis Date    Asthma     Gallstones     PONV (postoperative nausea and vomiting)     Seasonal allergies         Past Surgical History:   Procedure Laterality Date    CHOLECYSTECTOMY WITH INTRAOPERATIVE CHOLANGIOGRAM N/A 2020    Procedure: CHOLECYSTECTOMY LAPAROSCOPIC INTRAOPERATIVE CHOLANGIOGRAM      (C-ARM#2)        (ANESTHESIA REQUEST JEANNINE OR TIFFANIE, NO ANESTHESIA STUDENTS);  Surgeon: Franky Max MD;  Location: [facility] OR;  Service: General    COLONOSCOPY N/A 2022    Procedure: COLONOSCOPY 4:00;  Surgeon: Saad Lizarraga DO;  Location: [facility] ENDOSCOPY;  Service: Gastroenterology;  Laterality: N/A;    DILATATION AND CURETTAGE      ENDOSCOPY N/A 2020    Procedure: ESOPHAGOGASTRODUODENOSCOPY;  Surgeon: Saad Lizarraga DO;  Location: [facility] ENDOSCOPY;  Service: Gastroenterology;  Laterality: N/A;    ENDOSCOPY N/A 2022    Procedure: ESOPHAGOGASTRODUODENOSCOPY 4:00;  Surgeon: Saad Lizarraga DO;  Location: [facility] ENDOSCOPY;  Service: Gastroenterology;  Laterality: N/A;        PT Ortho       Row Name 23 1519       Subjective Comments    Subjective Comments Pt feels R foot is getting " "better. She was able to be up on her feet cleaning and working around home 5 hours. Pain increased to 5/10, but she was having 8/10 prior to beginning PT. She is performing HEP as prescribed with added resistance band.  Pt says she had \"easy day\" as school day with sitting most of day. She arrives 0/10 pain R foot  -       Precautions and Contraindications    Precautions/Limitations no known precautions/limitations  -       Subjective Pain    Able to rate subjective pain? yes  -    Pre-Treatment Pain Level 0  -    Post-Treatment Pain Level 1  -       Posture/Observations    Posture/Observations Comments increased \"static\" and thickening of R heel tissue central to heel  -              User Key  (r) = Recorded By, (t) = Taken By, (c) = Cosigned By      Initials Name Provider Type     Rozina Christianson, PT Physical Therapist                                 PT Assessment/Plan       Row Name 09/11/23 1519          PT Assessment    Functional Limitations Impaired gait;Limitations in community activities;Limitations in functional capacity and performance  -     Impairments Gait;Pain;Impaired muscle length;Impaired muscle endurance  -     Assessment Comments Overall, pt demo improving tolerance for walking, WB wtih abiltiy to stand, work in home 5 hours. Pain increased to 5/10; prior to PT worst pain rating 8/10. She has not been able to fully return to walking for ex at park, but improving WB walking activity with less pain. Awaiting custom orthotics  -     Rehab Potential Good  -     Patient/caregiver participated in establishment of treatment plan and goals Yes  -     Patient would benefit from skilled therapy intervention Yes  -        PT Plan    PT Frequency 2x/week  -     Predicted Duration of Therapy Intervention (PT) 2-3 wks  -     PT Plan Comments continue address unmet goals; pending arrival custom orthotics  -               User Key  (r) = Recorded By, (t) = Taken By, (c) = Cosigned " "By      Initials Name Provider Type     Rozina Christianson, PT Physical Therapist                       OP Exercises       Row Name 09/11/23 1519             Subjective Comments    Subjective Comments Pt feels R foot is getting better. She was able to be up on her feet cleaning and working around home 5 hours. Pain increased to 5/10, but she was having 8/10 prior to beginning PT. She is performing HEP as prescribed with added resistance band.  Pt says she had \"easy day\" as school day with sitting most of day. She arrives 0/10 pain R foot  -         Subjective Pain    Able to rate subjective pain? yes  -      Pre-Treatment Pain Level 0  -      Post-Treatment Pain Level 1  -         Exercise 1    Exercise Name 1 ellipitcal LE only  -      Time 1 5 minutes  -         Exercise 2    Exercise Name 2 slant gastroc stretch  -      Reps 2 1  -      Time 2 1 minute  -         Exercise 3    Exercise Name 3 slant soleus stretch B  -      Reps 3 1  -      Time 3 1 minute  -         Exercise 4    Exercise Name 4 manual  -         Exercise 5    Exercise Name 5 toe walking long airex pad gastroc strength in // bars  -      Reps 5 20 reps  -         Exercise 6    Exercise Name 6 pt in // [facility] BAPS lvl 3 R single leg pf, df; in/eversion  -      Reps 6 20 ea  -         Exercise 7    Exercise Name 7 BOSO step on/offs no shoes R/L SLS coming off; high lunge back foot stepping on  -      Reps 7 20 ea  -                User Key  (r) = Recorded By, (t) = Taken By, (c) = Cosigned By      Initials Name Provider Type     Rozina Christianson, PT Physical Therapist                             Manual Rx (last 36 hours)       Manual Treatments       Row Name 09/11/23 1519             Manual Rx 1    Manual Rx 1 Location prone R heel STM PT hands and instrument  -      Manual Rx 1 Duration 5 minutes  -         Manual Rx 2    Manual Rx 2 Location prone R gastroc, soleus STM instrument  -      Manual Rx 2 " "Duration 3 minutes  -         Manual Rx 3    Manual Rx 3 Location prone talocrural anterior mobs and subtalar mobilization  -      Manual Rx 3 Grade III  -      Manual Rx 3 Duration 2 minutes  -                User Key  (r) = Recorded By, (t) = Taken By, (c) = Cosigned By      Initials Name Provider Type     Rozina Christianson, PT Physical Therapist                     PT OP Goals       Row Name 09/11/23 1519          PT Short Term Goals    STG Date to Achieve 09/21/23  -     STG 1 Subjective reports of feeling 80% improved  -     STG 1 Progress Not Met  -     STG 2 subjective reports of <2/10 of ttp gastroc/soleus  -     STG 2 Progress Not Met  -     STG 3 Complete a 6' walk test without increased pain noted  -     STG 3 Progress Not Met  not noted  -        Long Term Goals    LTG Date to Achieve 09/21/23  -     LTG 1 added on addendum:   Pt will demo 5/5 R hip stabilization strength abduction MMT in   -     LTG 2 Pt will demo standing single R  heel rise light UE assist for balance to 6\"  from floor symmetrical to  to B heel rise 6\"  to demo improved gastroc/soleus strength  -     LTG 3 Pt able to complete single single R heel rise with light UE support for balance and control eccentrically without foot flop lowering  -     LTG 4 Pt will demo ability to perform seated and standing toe yoga on R involved foot to demo improved intrinsic control  -     LT 5 [place] without increased symptoms to demo initiation to PLOF  -        Time Calculation    PT Goal Re-Cert Due Date 09/21/23  -               User Key  (r) = Recorded By, (t) = Taken By, (c) = Cosigned By      Initials Name Provider Type     Rozina Christianson, PT Physical Therapist                                   Time Calculation:   Start Time: 1519  Stop Time: 1600  Time Calculation (min): 41 min  Therapy Charges for Today       Code Description Service Date Service " Provider Modifiers Qty    20851277374  PT MANUAL THERAPY EA 15 MIN 9/11/2023 Rozina Christianson, PT GP 1    [id number]  PT THER PROC EA 15 MIN 9/11/2023 Rozina Christianson, PT GP 2                      Rozina Christianson, PT  9/11/2023

## 2023-09-14 ENCOUNTER — HOSPITAL ENCOUNTER (OUTPATIENT)
Dept: PHYSICAL THERAPY | Facility: HOSPITAL | Age: 48
Setting detail: THERAPIES SERIES
Discharge: HOME | End: 2023-09-14
Payer: COMMERCIAL

## 2023-09-14 DIAGNOSIS — M79.671 RIGHT FOOT PAIN: ICD-10-CM

## 2023-09-14 DIAGNOSIS — M72.2 PLANTAR FASCIITIS: Primary | ICD-10-CM

## 2023-09-14 PROCEDURE — 97140 MANUAL THERAPY 1/> REGIONS: CPT | Performed by: PHYSICAL THERAPIST

## 2023-09-14 PROCEDURE — 97535 SELF CARE MNGMENT TRAINING: CPT | Performed by: PHYSICAL THERAPIST

## 2023-09-14 NOTE — THERAPY TREATMENT NOTE
"  Outpatient Physical Therapy Ortho Treatment Note  [facility]     Patient Name: Estee Owens  : 1975  MRN: [id number]  Today's Date: 9/15/2023      Visit Date: 2023  Visit attendance: 10/90 approved  % Improved:  \"some\"  MD appt: 10-16-23  Recert due date: 23     Therapy diagnosis:R foot/heel pain  Visit Dx:    ICD-10-CM ICD-9-CM   1. Plantar fasciitis  M72.2 728.71   2. Right foot pain  M79.671 729.5       Patient Active Problem List   Diagnosis    Cholecystitis    Gastroesophageal reflux disease without esophagitis    Pain of upper abdomen    Encounter for screening for malignant neoplasm of colon    Bacterial enteritis    Essential hypertension    Chest pressure    Dyspnea on exertion    Family history of early CAD        Past Medical History:   Diagnosis Date    Asthma     Gallstones     PONV (postoperative nausea and vomiting)     Seasonal allergies         Past Surgical History:   Procedure Laterality Date    CHOLECYSTECTOMY WITH INTRAOPERATIVE CHOLANGIOGRAM N/A 2020    Procedure: CHOLECYSTECTOMY LAPAROSCOPIC INTRAOPERATIVE CHOLANGIOGRAM      (C-ARM#2)        (ANESTHESIA REQUEST JEANNINE OR TIFFANIE, NO ANESTHESIA STUDENTS);  Surgeon: Franky Max MD;  Location: [facility] OR;  Service: General    COLONOSCOPY N/A 2022    Procedure: COLONOSCOPY 4:00;  Surgeon: Saad Lizarraga DO;  Location: [facility] ENDOSCOPY;  Service: Gastroenterology;  Laterality: N/A;    DILATATION AND CURETTAGE      ENDOSCOPY N/A 2020    Procedure: ESOPHAGOGASTRODUODENOSCOPY;  Surgeon: Saad Lizarraga DO;  Location: [facility] ENDOSCOPY;  Service: Gastroenterology;  Laterality: N/A;    ENDOSCOPY N/A 2022    Procedure: ESOPHAGOGASTRODUODENOSCOPY 4:00;  Surgeon: Saad Lizarraga DO;  Location: [facility] ENDOSCOPY;  Service: Gastroenterology;  Laterality: N/A;        PT Ortho       Row Name 23 1600       Subjective    Subjective Comments Reports last night feet were throbbing " and today had mild pain. Awaiting orhtotics.  -BB       Precautions and Contraindications    Precautions/Limitations no known precautions/limitations  -BB       Subjective Pain    Able to rate subjective pain? yes  -BB    Pre-Treatment Pain Level 2  -BB    Post-Treatment Pain Level 0  -BB       Posture/Observations    Posture/Observations Comments no antalgics. Tender and taut PF.  -BB              User Key  (r) = Recorded By, (t) = Taken By, (c) = Cosigned By      Initials Name Provider Type    Cecilia Ansari PT DPT Physical Therapist                                 PT Assessment/Plan       Row Name 09/14/23 1600          PT Assessment    Assessment Comments Patient noted to have firm tissue knot of plantar calcaneus, given additional heel spur pad to off load. Responded well to treatment today with reduced pain.  -BB     Patient/caregiver participated in establishment of treatment plan and goals Yes  -BB     Patient would benefit from skilled therapy intervention Yes  -BB        PT Plan    PT Frequency 2x/week  -BB     Predicted Duration of Therapy Intervention (PT) 2-3 weeks  -BB     PT Plan Comments Recheck next week- check for orthotics  -BB               User Key  (r) = Recorded By, (t) = Taken By, (c) = Cosigned By      Initials Name Provider Type    Cecilia Ansari, PT DPT Physical Therapist                       OP Exercises       Row Name 09/14/23 1600             Subjective    Subjective Comments Reports last night feet were throbbing and today had mild pain. Awaiting orhtotics.  -BB         Subjective Pain    Able to rate subjective pain? yes  -BB      Pre-Treatment Pain Level 2  -BB      Post-Treatment Pain Level 0  -BB         Exercise 1    Exercise Name 1 review of symptoms and orthotics  -BB      Additional Comments 3'  -BB         Exercise 2    Exercise Name 2 fludio with AROM  -BB      Time 2 10'  -BB         Exercise 3    Exercise Name 3 MFR to PF plantar surface  -BB      Time 3 25'  -BB    "             User Key  (r) = Recorded By, (t) = Taken By, (c) = Cosigned By      Initials Name Provider Type    Cecilia Ansari, MARIO DPT Physical Therapist                                  PT OP Goals       Row Name 09/14/23 1600          PT Short Term Goals    STG Date to Achieve 09/21/23  -BB     STG 1 Subjective reports of feeling 80% improved  -BB     STG 1 Progress Not Met  -BB     STG 2 subjective reports of <2/10 of ttp gastroc/soleus  -BB     STG 2 Progress Not Met  -BB     STG 3 Complete a 6' walk test without increased pain noted  -BB     STG 3 Progress Not Met  not noted  -BB        Long Term Goals    LTG Date to Achieve 09/21/23  -BB     LTG 1 added on addendum:   Pt will demo 5/5 R hip stabilization strength abduction MMT in SL  -BB     LTG 2 Pt will demo standing single R  heel rise light UE assist for balance to 6\"  from floor symmetrical to  to B heel rise 6\"  to demo improved gastroc/soleus strength  -BB     LTG 3 Pt able to complete single single R heel rise with light UE support for balance and control eccentrically without foot flop lowering  -BB     LTG 4 Pt will demo ability to perform seated and standing toe yoga on R involved foot to demo improved intrinsic control  -BB     LTG 5 Pt [place] at [place] 30 minutes without increased symptoms to demo initiation to PLOF  -BB        Time Calculation    PT Goal Re-Cert Due Date 09/21/23  -BB               User Key  (r) = Recorded By, (t) = Taken By, (c) = Cosigned By      Initials Name Provider Type    Cecilia Ansari PT DPT Physical Therapist                                   Time Calculation:   Start Time: 1604  Stop Time: 1645  Time Calculation (min): 41 min  Therapy Charges for Today       Code Description Service Date Service Provider Modifiers Qty    04218367994 HC PT MANUAL THERAPY EA 15 MIN 9/14/2023 Cecilia Dallas PT DPT GP 2    12679715109 HC PT SELF CARE/MGMT/TRAIN EA 15 MIN 9/14/2023 Cecilia Dallas PT " DPT GP 1                      Cecilia Dallas, PT DPT  9/15/2023

## 2023-09-18 ENCOUNTER — HOSPITAL ENCOUNTER (OUTPATIENT)
Dept: PHYSICAL THERAPY | Facility: HOSPITAL | Age: 48
Setting detail: THERAPIES SERIES
Discharge: HOME | End: 2023-09-18
Payer: COMMERCIAL

## 2023-09-18 DIAGNOSIS — M79.671 RIGHT FOOT PAIN: ICD-10-CM

## 2023-09-18 DIAGNOSIS — M72.2 PLANTAR FASCIITIS: Primary | ICD-10-CM

## 2023-09-18 PROCEDURE — 97140 MANUAL THERAPY 1/> REGIONS: CPT

## 2023-09-18 PROCEDURE — 97110 THERAPEUTIC EXERCISES: CPT

## 2023-09-18 NOTE — THERAPY TREATMENT NOTE
Outpatient Physical Therapy Ortho Treatment Note  [facility]     Patient Name: Estee Owens  : 1975  MRN: [id number]  Today's Date: 2023    Visit attendance:   % Improved:  approved  MD appt: 10-16-23  Recert due date: 23    Therapy diagnosis:R foot/heel pain    Visit Date: 2023    Visit Dx:    ICD-10-CM ICD-9-CM   1. Plantar fasciitis  M72.2 728.71   2. Right foot pain  M79.671 729.5       Patient Active Problem List   Diagnosis    Cholecystitis    Gastroesophageal reflux disease without esophagitis    Pain of upper abdomen    Encounter for screening for malignant neoplasm of colon    Bacterial enteritis    Essential hypertension    Chest pressure    Dyspnea on exertion    Family history of early CAD        Past Medical History:   Diagnosis Date    Asthma     Gallstones     PONV (postoperative nausea and vomiting)     Seasonal allergies         Past Surgical History:   Procedure Laterality Date    CHOLECYSTECTOMY WITH INTRAOPERATIVE CHOLANGIOGRAM N/A 2020    Procedure: CHOLECYSTECTOMY LAPAROSCOPIC INTRAOPERATIVE CHOLANGIOGRAM      (C-ARM#2)        (ANESTHESIA REQUEST JEANNINE OR TIFFANIE, NO ANESTHESIA STUDENTS);  Surgeon: Franky Max MD;  Location: [facility] OR;  Service: General    COLONOSCOPY N/A 2022    Procedure: COLONOSCOPY 4:00;  Surgeon: Saad Lizarraga DO;  Location: [facility] ENDOSCOPY;  Service: Gastroenterology;  Laterality: N/A;    DILATATION AND CURETTAGE      ENDOSCOPY N/A 2020    Procedure: ESOPHAGOGASTRODUODENOSCOPY;  Surgeon: Saad Lizarraga DO;  Location: [facility] ENDOSCOPY;  Service: Gastroenterology;  Laterality: N/A;    ENDOSCOPY N/A 2022    Procedure: ESOPHAGOGASTRODUODENOSCOPY 4:00;  Surgeon: Saad Lizarraga DO;  Location: [facility] ENDOSCOPY;  Service: Gastroenterology;  Laterality: N/A;        PT Ortho       Row Name 23 3467       Subjective    Subjective Comments Pain more today in R heel and last night  "without change in activity. Pain 3/10. Tried icing 15 minutes twice today without any change. Usually pain arriving in PT 1/10. Awaiting orthotics  [facility]       Precautions and Contraindications    Precautions/Limitations no known precautions/limitations  -       Subjective Pain    Able to rate subjective pain? yes  -    Pre-Treatment Pain Level 3  -       Posture/Observations    Posture/Observations Comments no antalgia; TTP R central heel  -              User Key  (r) = Recorded By, (t) = Taken By, (c) = Cosigned By      Initials Name Provider Type     Rozina Christianson, PT Physical Therapist                                 PT Assessment/Plan       Row Name 09/18/23 2528          PT Assessment    Assessment Comments pt reports she's hopeful that R heel pain is better than when she started. Today pain is higher than usual with just arriving in PT. PT addressing reducing TTP over central R heel. Awaiting on custom orthotics to unload central heel where bone spur is.  PT continues to assess TTP and increased crepitus on palpation.  Pain \"different\" end of session, but still sore.  -     Rehab Potential Good  -     Patient/caregiver participated in establishment of treatment plan and goals Yes  -     Patient would benefit from skilled therapy intervention Yes  -        PT Plan    PT Frequency 2x/week  -     Predicted Duration of Therapy Intervention (PT) 2 more wks  -     PT Plan Comments Progress note due 9/21/23  -               User Key  (r) = Recorded By, (t) = Taken By, (c) = Cosigned By      Initials Name Provider Type     Rozina Christianson, PT Physical Therapist                       OP Exercises       Row Name 09/18/23 3463             Subjective    Subjective Comments Pain more today in R heel and last night without change in activity. Pain 3/10. Tried icing 15 minutes twice today without any change. Usually pain arriving in PT 1/10. Awaiting orthotics  -         Subjective Pain    Able to " "rate subjective pain? yes  -      Pre-Treatment Pain Level 3  -         Exercise 1    Exercise Name 1 TM walking forward, retro, laterally R, L  -      Additional Comments 2mph to .8 mph depending on direction x 8 minutes  -         Exercise 2    Exercise Name 2 fludio with AROM R foot  -      Time 2 8 minutes  -         Exercise 3    Exercise Name 3 manual: MFR to PF plantar surface; ice massage  -      Time 3 23  -                User Key  (r) = Recorded By, (t) = Taken By, (c) = Cosigned By      Initials Name Provider Type     Rozina Christianson, PT Physical Therapist                             Manual Rx (last 36 hours)       Manual Treatments       Row Name 09/18/23 1347             Manual Rx 1    Manual Rx 1 Location prone MFR R heel, PF  -      Manual Rx 1 Duration 13 minutes  -         Manual Rx 2    Manual Rx 2 Location prone ice massage R heel  -      Manual Rx 2 Duration 10 minutes  -                User Key  (r) = Recorded By, (t) = Taken By, (c) = Cosigned By      Initials Name Provider Type     Rozina Christianson, PT Physical Therapist                     PT OP Goals       Row Name 09/18/23 1347          PT Short Term Goals    STG Date to Achieve 09/21/23  -     STG 1 Subjective reports of feeling 80% improved  -     STG 1 Progress Ongoing  [facility]     STG 2 subjective reports of <2/10 of ttp gastroc/soleus  -     STG 2 Progress Ongoing  [facility]     STG 3 Complete a 6' walk test without increased pain noted  -     STG 3 Progress Ongoing  not noted  -        Long Term Goals    LTG Date to Achieve 09/21/23  -     LTG 1 added on addendum:   Pt will demo 5/5 R hip stabilization strength abduction MMT in SL  -     LTG 1 Progress Ongoing  [facility]     LTG 2 Pt will demo standing single R  heel rise light UE assist for balance to 6\"  from floor symmetrical to  to B heel rise 6\"  to demo improved gastroc/soleus strength  -     LTG 2 Progress Ongoing  [facility]     LTG 3 Pt able to complete " single single R heel rise with light UE support for balance and control eccentrically without foot flop lowering  -     LTG 3 Progress Ongoing  -     LTG 4 Pt will demo ability to perform seated and standing toe yoga on R involved foot to demo improved intrinsic control  -     LTG 4 Progress Ongoing  -     LTG 5 Pt [place] at [place] 30 minutes without increased symptoms to demo initiation to PLOF  -[facility] 5 Progress Ongoing  -        Time Calculation    PT Goal Re-Cert Due Date 09/21/23  -               User Key  (r) = Recorded By, (t) = Taken By, (c) = Cosigned By      Initials Name Provider Type     Rozina Christianson, PT Physical Therapist                    Therapy Education  Education Details: ed pt to continue HEP as prescribed  Program: Reinforced  How Provided: Verbal  Provided to: Patient  Level of Understanding: Verbalized              Time Calculation:   Start Time: 1347  Stop Time: 1430  Time Calculation (min): 43 min  Therapy Charges for Today       Code Description Service Date Service Provider Modifiers Qty    88705242049  PT MANUAL THERAPY EA 15 MIN 9/18/2023 Rozina Christianson, PT GP 2    [id number]  PT THER PROC EA 15 MIN 9/18/2023 Rozina Christiasnon PT GP 1                      Rozina Christianson PT  9/18/2023

## 2023-09-20 ENCOUNTER — HOSPITAL ENCOUNTER (OUTPATIENT)
Dept: PHYSICAL THERAPY | Facility: HOSPITAL | Age: 48
Setting detail: THERAPIES SERIES
Discharge: HOME | End: 2023-09-20
Payer: COMMERCIAL

## 2023-09-20 DIAGNOSIS — M72.2 PLANTAR FASCIITIS: Primary | ICD-10-CM

## 2023-09-20 DIAGNOSIS — M79.671 RIGHT FOOT PAIN: ICD-10-CM

## 2023-09-20 PROCEDURE — 97110 THERAPEUTIC EXERCISES: CPT

## 2023-09-20 PROCEDURE — 97140 MANUAL THERAPY 1/> REGIONS: CPT

## 2023-09-20 NOTE — THERAPY TREATMENT NOTE
Outpatient Physical Therapy Ortho Treatment Note  [facility]     Patient Name: Estee Owens  : 1975  MRN: [id number]  Today's Date: 2023      Visit Date: 2023  Attendance:  (90 approved)  Subjective Improvement: 55%  Next MD Visit: 10/16/23  Recert Date: 23    Therapy Diagnosis: R foot/heel pain      Visit Dx:    ICD-10-CM ICD-9-CM   1. Plantar fasciitis  M72.2 728.71   2. Right foot pain  M79.671 729.5       Patient Active Problem List   Diagnosis    Cholecystitis    Gastroesophageal reflux disease without esophagitis    Pain of upper abdomen    Encounter for screening for malignant neoplasm of colon    Bacterial enteritis    Essential hypertension    Chest pressure    Dyspnea on exertion    Family history of early CAD        Past Medical History:   Diagnosis Date    Asthma     Gallstones     PONV (postoperative nausea and vomiting)     Seasonal allergies         Past Surgical History:   Procedure Laterality Date    CHOLECYSTECTOMY WITH INTRAOPERATIVE CHOLANGIOGRAM N/A 2020    Procedure: CHOLECYSTECTOMY LAPAROSCOPIC INTRAOPERATIVE CHOLANGIOGRAM      (C-ARM#2)        (ANESTHESIA REQUEST JEANNINE OR TIFFANIE, NO ANESTHESIA STUDENTS);  Surgeon: Franky Max MD;  Location: [facility] OR;  Service: General    COLONOSCOPY N/A 2022    Procedure: COLONOSCOPY 4:00;  Surgeon: Saad Lizarraga DO;  Location: [facility] ENDOSCOPY;  Service: Gastroenterology;  Laterality: N/A;    DILATATION AND CURETTAGE      ENDOSCOPY N/A 2020    Procedure: ESOPHAGOGASTRODUODENOSCOPY;  Surgeon: Saad Lizarraga DO;  Location: [facility] ENDOSCOPY;  Service: Gastroenterology;  Laterality: N/A;    ENDOSCOPY N/A 2022    Procedure: ESOPHAGOGASTRODUODENOSCOPY 4:00;  Surgeon: Saad Lizarraga DO;  Location: [facility] ENDOSCOPY;  Service: Gastroenterology;  Laterality: N/A;        PT Ortho       Row Name 23 1400       Subjective    Subjective Comments Pt reports that she has  days that are really good and then days with increased pain. She used to have pain every day and reports that her pain is not as consistent as it used to be.  -       Precautions and Contraindications    Precautions/Limitations no known precautions/limitations  -       Subjective Pain    Able to rate subjective pain? yes  -    Pre-Treatment Pain Level 1  -    Post-Treatment Pain Level 1  -       Posture/Observations    Posture/Observations Comments Palpable restrictions in PF medially and in heel.  -              User Key  (r) = Recorded By, (t) = Taken By, (c) = Cosigned By      Initials Name Provider Type     Quintin Reid, PT Physical Therapist                                 PT Assessment/Plan       Row Name 09/20/23 1400          PT Assessment    Functional Limitations Impaired gait;Limitations in community activities;Limitations in functional capacity and performance  -     Impairments Gait;Pain;Impaired muscle length;Impaired muscle endurance  -     Assessment Comments Two goals met this date for SL HR eccentric control and for toe yoga. Pt educated to perform HR in ankle/foot neutral as she has a tendency to roll onto the outside of the foot causing increased supination/IV. Pt was able to demonstrate improved form with exercises after corrections. Pt was encouraged to cont with HR exercises at home to promote improved strength and control. Ended session with manual to planar aspect of foot. Improved tissue pliability with less restrictions palpable end of session. No fluiodtherapy this date as pt reported that she does not feel like it has helped any the last few visits.  -     Rehab Potential Good  -     Patient/caregiver participated in establishment of treatment plan and goals Yes  -     Patient would benefit from skilled therapy intervention Yes  -        PT Plan    PT Frequency 2x/week  -     Predicted Duration of Therapy Intervention (PT) 2 more wks  -     PT Plan  Comments Recheck next visit.  -               User Key  (r) = Recorded By, (t) = Taken By, (c) = Cosigned By      Initials Name Provider Type    Quintin Camp PT Physical Therapist                       OP Exercises       Row Name 09/20/23 1400             Subjective    Subjective Comments Pt reports that she has days that are really good and then days with increased pain. She used to have pain every day and reports that her pain is not as consistent as it used to be.  -         Subjective Pain    Able to rate subjective pain? yes  -      Pre-Treatment Pain Level 1  -      Post-Treatment Pain Level 1  -         Total Minutes    16035 - PT Therapeutic Exercise Minutes 25  -      42490 - PT Manual Therapy Minutes 15  -         Exercise 1    Exercise Name 1 TM walking forward, retro, laterally R, L  -      Time 1 10 min total (2 min each direction other than fwd 4 min)  -      Additional Comments fwd 2 mph. lat and retro 0.8 mph  -         Exercise 2    Exercise Name 2 Toe yoga sitting  -      Sets 2 1  -      Reps 2 20  -MH         Exercise 3    Exercise Name 3 Toe yoga standing  -      Sets 3 1  -      Reps 3 20  -MH         Exercise 4    Exercise Name 4 SLS on R LE  -      Sets 4 3  -      Time 4 15 sec hold  -         Exercise 5    Exercise Name 5 DL HR  -      Sets 5 1  -      Reps 5 15  -MH      Additional Comments Cueing to remain in talar neutral (tends to IV/sup)  -         Exercise 6    Exercise Name 6 SL HR- focus on slow eccentric phase  -      Sets 6 1  -      Reps 6 10  -MH      Additional Comments Cueing to remain in talar neutral (tends to IV/sup)  -                User Key  (r) = Recorded By, (t) = Taken By, (c) = Cosigned By      Initials Name Provider Type    Quintin Camp, MARIO Physical Therapist                             Manual Rx (last 36 hours)       Manual Treatments       Row Name 09/20/23 1400             Total Minutes    89339 - PT Manual  "Therapy Minutes 15  -         Manual Rx 1    Manual Rx 1 Location Prone: R PF, heel  -      Manual Rx 1 Type STM, MFR, pin and stretch  -      Manual Rx 1 Duration 15 min  -                User Key  (r) = Recorded By, (t) = Taken By, (c) = Cosigned By      Initials Name Provider Type    Quintin Camp, PT Physical Therapist                     PT OP Goals       Row Name 09/20/23 1400          PT Short Term Goals    STG Date to Achieve 09/21/23  -     STG 1 Subjective reports of feeling 80% improved  -     STG 1 Progress Ongoing  -     STG 1 Progress Comments 55%  -     STG 2 subjective reports of <2/10 of ttp gastroc/soleus  -     STG 2 Progress Ongoing  [facility]     STG 3 Complete a 6' walk test without increased pain noted  -     STG 3 Progress Ongoing  not noted  -        Long Term Goals    LTG Date to Achieve 09/21/23  -     LTG 1 added on addendum:   Pt will demo 5/5 R hip stabilization strength abduction MMT in SL  -     LTG 1 Progress Ongoing  [facility]     LTG 2 Pt will demo standing single R  heel rise light UE assist for balance to 6\"  from floor symmetrical to B heel rise 6\"  to demo improved gastroc/soleus strength  -     LTG 2 Progress Ongoing  [facility]     LTG 3 Pt able to complete single R heel rise with light UE support for balance and control eccentrically without foot flop lowering  -     LTG 3 Progress Met   -MH     LTG 4 Pt will demo ability to perform seated and standing toe yoga on R involved foot to demo improved intrinsic control  -     LT 4 Progress Met   -MH     LT 5 Pt able to [place] at least 30 minutes without increased symptoms to demo initiation to PLOF  -     LT 5 Progress Ongoing  [facility]        Time Calculation    PT Goal Re-Cert Due Date 09/21/23  -               User Key  (r) = Recorded By, (t) = Taken By, (c) = Cosigned By      Initials Name Provider Type    Quintin Camp, MARIO Physical Therapist                             "       Time Calculation:   Start Time: 1435  Stop Time: 1515  Time Calculation (min): 40 min  Timed Charges  26637 - PT Therapeutic Exercise Minutes: 25  40864 - PT Manual Therapy Minutes: 15  Total Minutes  Timed Charges Total Minutes: 40   Total Minutes: 40  Therapy Charges for Today       Code Description Service Date Service Provider Modifiers Qty    16661901749 HC PT THER PROC EA 15 MIN 9/20/2023 Quintin Reid, PT GP 2    25786572378 HC PT MANUAL THERAPY EA 15 MIN 9/20/2023 Quintin Reid, PT GP 1                      Quintin Reid, PT, DPT  9/20/2023

## 2023-09-25 ENCOUNTER — HOSPITAL ENCOUNTER (OUTPATIENT)
Dept: PHYSICAL THERAPY | Facility: HOSPITAL | Age: 48
Setting detail: THERAPIES SERIES
Discharge: HOME | End: 2023-09-25
Payer: COMMERCIAL

## 2023-09-25 ENCOUNTER — APPOINTMENT (OUTPATIENT)
Dept: PHYSICAL THERAPY | Facility: HOSPITAL | Age: 48
End: 2023-09-25
Payer: COMMERCIAL

## 2023-09-25 DIAGNOSIS — M72.2 PLANTAR FASCIITIS: Primary | ICD-10-CM

## 2023-09-25 DIAGNOSIS — M79.671 RIGHT FOOT PAIN: ICD-10-CM

## 2023-09-25 PROCEDURE — 97535 SELF CARE MNGMENT TRAINING: CPT | Performed by: PHYSICAL THERAPIST

## 2023-09-25 PROCEDURE — 97140 MANUAL THERAPY 1/> REGIONS: CPT | Performed by: PHYSICAL THERAPIST

## 2023-09-25 NOTE — THERAPY PROGRESS REPORT/RE-CERT
Outpatient Physical Therapy Ortho Progress Note  [facility]     Patient Name: Estee Owens  : 1975  MRN: [id number]  Today's Date: 2023      Visit Date: 2023  Attendance:  (90 approved)  Subjective Improvement: 60%  Next MD Visit: 10/16/23  Recert Date:10/15/23     Therapy Diagnosis: R foot/heel pain  Visit Dx:    ICD-10-CM ICD-9-CM   1. Plantar fasciitis  M72.2 728.71   2. Right foot pain  M79.671 729.5       Patient Active Problem List   Diagnosis    Cholecystitis    Gastroesophageal reflux disease without esophagitis    Pain of upper abdomen    Encounter for screening for malignant neoplasm of colon    Bacterial enteritis    Essential hypertension    Chest pressure    Dyspnea on exertion    Family history of early CAD        Past Medical History:   Diagnosis Date    Asthma     Gallstones     PONV (postoperative nausea and vomiting)     Seasonal allergies         Past Surgical History:   Procedure Laterality Date    CHOLECYSTECTOMY WITH INTRAOPERATIVE CHOLANGIOGRAM N/A 2020    Procedure: CHOLECYSTECTOMY LAPAROSCOPIC INTRAOPERATIVE CHOLANGIOGRAM      (C-ARM#2)        (ANESTHESIA REQUEST JEANNINE OR TIFFANIE, NO ANESTHESIA STUDENTS);  Surgeon: Franky Max MD;  Location: [facility] OR;  Service: General    COLONOSCOPY N/A 2022    Procedure: COLONOSCOPY 4:00;  Surgeon: Saad Lizarraga DO;  Location: [facility] ENDOSCOPY;  Service: Gastroenterology;  Laterality: N/A;    DILATATION AND CURETTAGE      ENDOSCOPY N/A 2020    Procedure: ESOPHAGOGASTRODUODENOSCOPY;  Surgeon: Saad Lizarraga DO;  Location: [facility] ENDOSCOPY;  Service: Gastroenterology;  Laterality: N/A;    ENDOSCOPY N/A 2022    Procedure: ESOPHAGOGASTRODUODENOSCOPY 4:00;  Surgeon: Saad Lizarraga DO;  Location: [facility] ENDOSCOPY;  Service: Gastroenterology;  Laterality: N/A;        PT Ortho       Row Name 23 1300       Subjective    Subjective Comments Waked about 1.5 mile and was  sore.  -BB       Precautions and Contraindications    Precautions/Limitations no known precautions/limitations  -BB       Subjective Pain    Able to rate subjective pain? yes  -BB    Pre-Treatment Pain Level 2  -BB       Posture/Observations    Posture/Observations Comments ttp 7/10 right calcaneus medially  -BB       Foot/Ankle Palpation    Foot/Ankle Palpation? --  no gastroc ttp  -BB       General ROM    GENERAL ROM COMMENTS ankle ROM is WNL  -BB       MMT (Manual Muscle Testing)    General MMT Comments WNL without pain  -BB       Sensation    Sensation WNL? WNL  -BB              User Key  (r) = Recorded By, (t) = Taken By, (c) = Cosigned By      Initials Name Provider Type    Cecilia Ansari, PT DPT Physical Therapist                                 PT Assessment/Plan       Row Name 09/25/23 1300          PT Assessment    Functional Limitations Impaired gait;Limitations in community activities;Limitations in functional capacity and performance  -BB     Impairments Gait;Pain;Impaired muscle length;Impaired muscle endurance  -BB     Assessment Comments Orthotics fitted today with wear schedule reviewed. Reviewed HEP and progressed this date. Knots remain of calcaneus medial and lateral to PF insertion with ttp noted. Ankle ROM and strength is WFL. Recommend 2 weeks PT to monitor response to orthotics and use manual to improve tissue mobility and crepitus  -BB     Rehab Potential Good  -BB     Patient/caregiver participated in establishment of treatment plan and goals Yes  -BB     Patient would benefit from skilled therapy intervention Yes  -BB        PT Plan    PT Frequency 2x/week  -BB     Predicted Duration of Therapy Intervention (PT) 2 weeks to monitor orthotic response  -BB     PT Plan Comments continue with POC., check orthotic fit PRN, stretching, strength, manual, balance  -BB               User Key  (r) = Recorded By, (t) = Taken By, (c) = Cosigned By      Initials Name Provider Type    BB Blue,  "Cecilia, MARIO DPT Physical Therapist                       OP Exercises       Row Name 09/25/23 1300             Subjective    Subjective Comments Waked about 1.5 mile and was sore.  -BB         Subjective Pain    Able to rate subjective pain? yes  -BB      Pre-Treatment Pain Level 2  -BB      Post-Treatment Pain Level 2  -BB         Exercise 1    Exercise Name 1 orthotic checkout  -BB      Additional Comments wear schedule  -BB         Exercise 2    Exercise Name 2 MFR to PF and heel  -BB      Time 2 10'  -BB         Exercise 3    Exercise Name 3 6' walk test  -BB      Time 3 6'  -BB      Additional Comments no pain increase  -BB         Exercise 4    Exercise Name 4 Gastroc stretch  -BB      Sets 4 3  -BB      Time 4 30\"  -BB         Exercise 5    Exercise Name 5 Rolling pin for HEP  -BB      Time 5 2'  -BB                User Key  (r) = Recorded By, (t) = Taken By, (c) = Cosigned By      Initials Name Provider Type    Cecilia Ansari, MARIO DPT Physical Therapist                                  PT OP Goals       Row Name 09/25/23 1300          PT Short Term Goals    STG 1 Subjective reports of feeling 80% improved  -BB     STG 1 Progress Not Met  -BB     STG 1 Progress Comments 60%  -BB     STG 2 subjective reports of <2/10 of ttp gastroc/soleus  -BB     STG 2 Progress Met  -BB     STG 3 Complete a 6' walk test without increased pain noted  -BB        Long Term Goals    LTG Date to Achieve 09/21/23  -BB     LTG 1 added on addendum:   Pt will demo 5/5 R hip stabilization strength abduction MMT in SL  -BB     LTG 1 Progress Ongoing  -BB     LTG 2 Pt will demo standing single R  heel rise light UE assist for balance to 6\"  from floor symmetrical to  to B heel rise 6\"  to demo improved gastroc/soleus strength  -BB     LTG 2 Progress Ongoing  -BB     LTG 3 Pt able to complete single single R heel rise with light UE support for balance and control eccentrically without foot flop lowering  -BB     LTG 3 Progress Met  -BB "     LTG 4 Pt will demo ability to perform seated and standing toe yoga on R involved foot to demo improved intrinsic control  -BB     LTG 4 Progress Met  -BB     LTG 5 Pt able to [place] at least 30 minutes without increased symptoms to demo initiation to PLOF  -BB     LTG 5 Progress Ongoing  -BB        Time Calculation    PT Goal Re-Cert Due Date 10/16/23  -BB               User Key  (r) = Recorded By, (t) = Taken By, (c) = Cosigned By      Initials Name Provider Type    Cecilia Ansari, PT DPT Physical Therapist                                   Time Calculation:   Start Time: 1305  Stop Time: 1344  Time Calculation (min): 39 min  Therapy Charges for Today       Code Description Service Date Service Provider Modifiers Qty    87953795600 HC PT SELF CARE/MGMT/TRAIN EA 15 MIN 9/25/2023 Cecilia Dallas PT DPT GP 1    09321798269 HC PT MANUAL THERAPY EA 15 MIN 9/25/2023 Cecilia Dallas PT DPT GP 1    13517860114 HC PT THER SUPP EA 15 MIN 9/25/2023 Cecilia Dallas PT DPT GP 1                      Cecilia Dallas PT DPT  9/25/2023

## 2023-09-28 ENCOUNTER — HOSPITAL ENCOUNTER (OUTPATIENT)
Dept: PHYSICAL THERAPY | Facility: HOSPITAL | Age: 48
Setting detail: THERAPIES SERIES
Discharge: HOME | End: 2023-09-28
Payer: COMMERCIAL

## 2023-09-28 PROCEDURE — 97535 SELF CARE MNGMENT TRAINING: CPT

## 2023-09-28 PROCEDURE — 97140 MANUAL THERAPY 1/> REGIONS: CPT

## 2023-09-28 NOTE — THERAPY TREATMENT NOTE
Outpatient Physical Therapy Ortho Treatment Note  [facility]     Patient Name: Estee Owens  : 1975  MRN: [id number]  Today's Date: 2023    Visit attendance:  ins visits  % Improved: 65%  MD appt: 10-16-23  Recert due date: 10-15-23    Therapy diagnosis: R foot/heel pain    Visit Date: 2023    Visit Dx:  No diagnosis found.    Patient Active Problem List   Diagnosis    Cholecystitis    Gastroesophageal reflux disease without esophagitis    Pain of upper abdomen    Encounter for screening for malignant neoplasm of colon    Bacterial enteritis    Essential hypertension    Chest pressure    Dyspnea on exertion    Family history of early CAD        Past Medical History:   Diagnosis Date    Asthma     Gallstones     PONV (postoperative nausea and vomiting)     Seasonal allergies         Past Surgical History:   Procedure Laterality Date    CHOLECYSTECTOMY WITH INTRAOPERATIVE CHOLANGIOGRAM N/A 2020    Procedure: CHOLECYSTECTOMY LAPAROSCOPIC INTRAOPERATIVE CHOLANGIOGRAM      (C-ARM#2)        (ANESTHESIA REQUEST JEANNINE OR TIFFANIE, NO ANESTHESIA STUDENTS);  Surgeon: Franky Max MD;  Location: [facility] OR;  Service: General    COLONOSCOPY N/A 2022    Procedure: COLONOSCOPY 4:00;  Surgeon: Saad Lizarraga DO;  Location: [facility] ENDOSCOPY;  Service: Gastroenterology;  Laterality: N/A;    DILATATION AND CURETTAGE      ENDOSCOPY N/A 2020    Procedure: ESOPHAGOGASTRODUODENOSCOPY;  Surgeon: Saad Lizarraga DO;  Location: [facility] ENDOSCOPY;  Service: Gastroenterology;  Laterality: N/A;    ENDOSCOPY N/A 2022    Procedure: ESOPHAGOGASTRODUODENOSCOPY 4:00;  Surgeon: Saad Lizarraga DO;  Location: [facility] ENDOSCOPY;  Service: Gastroenterology;  Laterality: N/A;        PT Ortho       Row Name 23 1330       Subjective    Subjective Comments Pt is getting used to wearing orthotics issued 23. She is walking about 1 mile nightly with pain 3/10.  Has not  resumed walking with  on hills 45 minutes. She usually only has pain when WB long periods. 30 minutes of walking increases pain  -       Precautions and Contraindications    Precautions/Limitations no known precautions/limitations  -       Subjective Pain    Able to rate subjective pain? yes  -    Pre-Treatment Pain Level 0  -    Post-Treatment Pain Level 0  -              User Key  (r) = Recorded By, (t) = Taken By, (c) = Cosigned By      Initials Name Provider Type     Rozina Christianson, PT Physical Therapist                                 PT Assessment/Plan       Row Name 09/28/23 1330          PT Assessment    Assessment Comments Pt to continue HEP, increase wear tolerance of orthotics; and increase time walking dependent on symptoms. Pt understands. Overall pt pain much improved since beginning PT.  She continues to demo altered tissue changes over bone spur on calcaneous; creputis sensation but less TTP than at onset of PT.   Progressing toward all goals and most goals met.  Met all goals except 2.  -     Rehab Potential Good  -     Patient/caregiver participated in establishment of treatment plan and goals Yes  -     Patient would benefit from skilled therapy intervention Yes  -        PT Plan    Predicted Duration of Therapy Intervention (PT) Pt scheduled for f/u 10-9-23 if she needs me. If not, she will call to Ca and cont on her own as prescribed.  -     PT Plan Comments f/u once more prn then planned dc to HEP and continued use of orthotics, walking progression  -               User Key  (r) = Recorded By, (t) = Taken By, (c) = Cosigned By      Initials Name Provider Type     Rozina Christianson, PT Physical Therapist                       OP Exercises       Row Name 09/28/23 1330             Subjective    Subjective Comments Pt is getting used to wearing orthotics issued 9-25-23. She is walking about 1 mile nightly with pain 3/10.  Has not resumed walking with  on hills  "45 minutes. She usually only has pain when WB long periods. 30 minutes of walking increases pain  -         Subjective Pain    Able to rate subjective pain? yes  -      Pre-Treatment Pain Level 0  -      Post-Treatment Pain Level 0  -         Exercise 1    Exercise Name 1 pt education  -      Additional Comments 15 minutes  -         Exercise 2    Exercise Name 2 MFR to PF and heel  -      Time 2 23 minutes  -         Exercise 3    Exercise Name 3 PT assessment B heel rise 4 and 1/2 \"; single R heel rise 4 and 1/2\" good eccentric control  -                User Key  (r) = Recorded By, (t) = Taken By, (c) = Cosigned By      Initials Name Provider Type     Sammy Rzoina, PT Physical Therapist                             Manual Rx (last 36 hours)       Manual Treatments       Row Name 09/28/23 1330 09/28/23 1300          Manual Rx 1    Manual Rx 1 Location Prone R STM, MFR heel, Achilles, gastroc, soleus  - --  -     Manual Rx 1 Duration 23 minute on/off gastroc stretch position  - --  -               User Key  (r) = Recorded By, (t) = Taken By, (c) = Cosigned By      Initials Name Provider Type     Rozina Christianson, PT Physical Therapist                     PT OP Goals       Row Name 09/28/23 1330          PT Short Term Goals    STG 1 Subjective reports of feeling 80% improved  -     STG 1 Progress Not Met  [facility]     STG 1 Progress Comments 65%\  -     STG 2 subjective reports of <2/10 of ttp gastroc/soleus  -     STG 2 Progress Met  [facility]     STG 3 Complete a 6' walk test without increased pain noted  -     STG 3 Progress Met  [facility]        Long Term Goals    LTG Date to Achieve 09/21/23  -     LTG 1 added on addendum:   Pt will demo 5/5 R hip stabilization strength abduction MMT in   -     LTG 1 Progress Met  [facility]     LTG 2 Pt will demo standing single R  heel rise light UE assist for balance to 6\"  from floor symmetrical to  to B heel rise 6\"  to demo improved gastroc/soleus " "strength  -     LTG 2 Progress Met  -     LTG 2 Progress Comments B HR 4 and 1/2\" and single same 4 and 1/2\"  -[facility] 3 Pt able to complete single single R heel rise with light UE support for balance and control eccentrically without foot flop lowering  -     LTG [facility] Pt will demo ability to perform seated and standing toe yoga on R involved foot to demo improved intrinsic control  -[facility] 4 [facility]  [facility] Pt able to resume [place] at least 30 minutes without increased symptoms to demo initiation to PLOF  -[facility] 5 Progress Ongoing  -        Time Calculation    PT Goal Re-Cert Due Date 10/16/23  -               User Key  (r) = Recorded By, (t) = Taken By, (c) = Cosigned By      Initials Name Provider Type    Rozina Diallo PT Physical Therapist                    Therapy Education  Education Details: Ed pt in continued HEP. Pt verbalizing all ex as prescribed. PT ed pt in progression of wear scheduled of orthotics and progressing walking tolerance. She should not walk with pain reports increasing more than 2 points. Use pain as guide to progress back to functional walking/activity  Program: Progressed, Reinforced  How Provided: Verbal  Provided to: Patient  Level of Understanding: Verbalized              Time Calculation:   Start Time: 1345  Stop Time: 1423  Time Calculation (min): 38 min  Therapy Charges for Today       Code Description Service Date Service Provider Modifiers Qty    23681719613 HC PT MANUAL THERAPY EA 15 MIN 9/28/2023 Rozina Christianson PT GP 2    54067872546 HC PT SELF CARE/MGMT/TRAIN EA 15 MIN 9/28/2023 Rozina Christianson PT GP 1                      Rozina Christianson PT  9/28/2023     "

## (undated) DEVICE — TRAP,MUCUS SPECIMEN,40CC: Brand: MEDLINE

## (undated) DEVICE — GLV SURG SENSICARE PI LF PF 7.5 GRN STRL

## (undated) DEVICE — LUER-LOK 360°: Brand: CONNECTA, LUER-LOK

## (undated) DEVICE — SUT VIC 2/0 UR6 27IN VCP602H

## (undated) DEVICE — BITEBLOCK ENDO W/STRAP 60F A/ LF DISP

## (undated) DEVICE — VISUALIZATION SYSTEM: Brand: CLEARIFY

## (undated) DEVICE — GLV SURG TRIUMPH LT PF LTX 6.5 STRL

## (undated) DEVICE — GOWN,PREVENTION PLUS,XLNG/XXLARGE,STRL: Brand: MEDLINE

## (undated) DEVICE — PK LAP CHOLE LF 60

## (undated) DEVICE — GLV SURG SENSICARE PI PF LF 7 GRN STRL

## (undated) DEVICE — SINGLE-USE BIOPSY FORCEPS: Brand: RADIAL JAW 4

## (undated) DEVICE — THE KUMAR CATHETER®, USED IN CONJUNCTION WITH KUMAR CHOLANGIOGRAPHY® CLAMP, IS MEANT TO PROVIDE A MEANS OF LAPAROSCOPIC CHOLANGIOGRAPHY. IT COMPRISES A TRANSLUCENT TUBING ( 76 CM. LENGTH AND 16 GA. ) THAT CARRIES A 19 GA., 1.25 CM LONG NEEDLE AT THE END. THE KUMAR CATHETER® IS USED TO PUNCTURE THE HARTMANN'S POUCH OF THE GALLBLADDER FOR BILIARY ACCESS AND / OR ASPIRATION. PRODUCT IS LATEX FREE.: Brand: KUMAR CATHETER®

## (undated) DEVICE — HARMONIC ACE +7 LAPAROSCOPIC SHEARS ADVANCED HEMOSTASIS 5MM DIAMETER 36CM SHAFT LENGTH  FOR USE WITH GRAY HAND PIECE ONLY: Brand: HARMONIC ACE

## (undated) DEVICE — STERILE POLYISOPRENE POWDER-FREE SURGICAL GLOVES WITH EMOLLIENT COATING: Brand: PROTEXIS

## (undated) DEVICE — ENDOPATH XCEL DILATING TIP TROCARS WITH STABILITY SLEEVES: Brand: ENDOPATH XCEL

## (undated) DEVICE — CORE TRUMPET FOR SINGLE SOLUTION BAG: Brand: CORE DYNAMICS

## (undated) DEVICE — PDS II VLT 0 107CM AG ST3: Brand: ENDOLOOP

## (undated) DEVICE — ENDOPOUCH RETRIEVER SPECIMEN RETRIEVAL BAGS: Brand: ENDOPOUCH RETRIEVER

## (undated) DEVICE — SYR LUERLOK 30CC

## (undated) DEVICE — SUT MONOCRYL 4/0 PS2 27IN Y426H ETY426H

## (undated) DEVICE — GLV SURG TRIUMPH LT PF LTX 7 STRL

## (undated) DEVICE — SYR LUERLOK 20CC BX/50

## (undated) DEVICE — CVR SURG EQUIP BND RECTG 36X28

## (undated) DEVICE — BLUNT TIP TROCAR: Brand: AUTO SUTURE

## (undated) DEVICE — SOL IRRIG NACL 1000ML

## (undated) DEVICE — CANN SMPL SOFTECH BIFLO ETCO2 A/M 7FT

## (undated) DEVICE — MONOPOLAR METZENBAUM SCISSOR TIP, DISPOSABLE: Brand: MONOPOLAR METZENBAUM SCISSOR TIP, DISPOSABLE

## (undated) DEVICE — SKIN AFFIX SURG ADHESIVE 72/CS 0.55ML: Brand: MEDLINE